# Patient Record
Sex: FEMALE | Race: BLACK OR AFRICAN AMERICAN | NOT HISPANIC OR LATINO | Employment: FULL TIME | URBAN - METROPOLITAN AREA
[De-identification: names, ages, dates, MRNs, and addresses within clinical notes are randomized per-mention and may not be internally consistent; named-entity substitution may affect disease eponyms.]

---

## 2017-03-02 ENCOUNTER — OFFICE VISIT (OUTPATIENT)
Dept: OBSTETRICS AND GYNECOLOGY | Facility: CLINIC | Age: 27
End: 2017-03-02
Payer: COMMERCIAL

## 2017-03-02 ENCOUNTER — LAB VISIT (OUTPATIENT)
Dept: LAB | Facility: HOSPITAL | Age: 27
End: 2017-03-02
Attending: MIDWIFE
Payer: COMMERCIAL

## 2017-03-02 VITALS
DIASTOLIC BLOOD PRESSURE: 78 MMHG | WEIGHT: 234.81 LBS | HEIGHT: 64 IN | SYSTOLIC BLOOD PRESSURE: 120 MMHG | BODY MASS INDEX: 40.09 KG/M2

## 2017-03-02 DIAGNOSIS — N91.2 AMENORRHEA: ICD-10-CM

## 2017-03-02 DIAGNOSIS — N91.2 AMENORRHEA: Primary | ICD-10-CM

## 2017-03-02 LAB — HCG INTACT+B SERPL-ACNC: 127 MIU/ML

## 2017-03-02 PROCEDURE — 99999 PR PBB SHADOW E&M-EST. PATIENT-LVL II: CPT | Mod: PBBFAC,,, | Performed by: MIDWIFE

## 2017-03-02 PROCEDURE — 1160F RVW MEDS BY RX/DR IN RCRD: CPT | Mod: S$GLB,,, | Performed by: MIDWIFE

## 2017-03-02 PROCEDURE — 84702 CHORIONIC GONADOTROPIN TEST: CPT | Mod: PO

## 2017-03-02 PROCEDURE — 36415 COLL VENOUS BLD VENIPUNCTURE: CPT | Mod: PO

## 2017-03-02 PROCEDURE — 99212 OFFICE O/P EST SF 10 MIN: CPT | Mod: S$GLB,,, | Performed by: MIDWIFE

## 2017-03-02 NOTE — MR AVS SNAPSHOT
[facility]  [address] [address] [address]  Phone: [phone]                  Modesta Smith   3/2/2017 9:30 AM   Office Visit    Description:  Female : 1990   Provider:  Dequan Webb CNM   Department:  [facility]           Reason for Visit     Possible Pregnancy           Diagnoses this Visit        Comments    Amenorrhea    -  Primary            To Do List           Future Appointments        Provider Department Dept Phone    3/2/2017 10:20 AM [facility] LABORATORY [facility] [phone]      Goals (5 Years of Data)     None      [facility]     [facility] -  Assistance  Registered nurses in the [facility] provide clinical advisement, health education, appointment booking, and other advisory services.  Call for this free service at [phone].             Medications           Message regarding Medications     Verify the changes and/or additions to your medication regime listed below are the same as discussed with your clinician today.  If any of these changes or additions are incorrect, please notify your healthcare provider.        STOP taking these medications     drospirenone-ethinyl estradiol (ANY) 3-0.03 mg per tablet Take 1 tablet by mouth once daily.           Verify that the below list of medications is an accurate representation of the medications you are currently taking.  If none reported, the list may be blank. If incorrect, please contact your healthcare provider. Carry this list with you in case of emergency.           Current Medications            Clinical Reference Information           Your Vitals Were     BP                   120/78           Blood Pressure          Most Recent Value    BP  120/78      Allergies as of 3/2/2017     No Known Allergies      Immunizations Administered on Date of Encounter - 3/2/2017     None      Orders Placed During Today's Visit     Future  Labs/Procedures Expected by Expires    hCG, quantitative  3/2/2017 5/1/2018      [facility] Sign-Up     Activating your [facility] account is as easy as 1-2-3!     1) Visit [email or web address], select Sign Up Now, enter this activation code and your date of birth, then select Next.  [email or web address]  Expires: 4/16/2017 10:09 AM      2) Create a username and password to use when you visit [facility] in the future and select a security question in case you lose your password and select Next.    3) Enter your e-mail address and click Sign Up!    Additional Information  If you have questions, please e-mail [email or web address] or call [phone] to talk to our [facility] staff. Remember, [facility] is NOT to be used for urgent needs. For medical emergencies, dial 911.         Language Assistance Services     ATTENTION: Language assistance services are available, free of charge. Please call [phone].      ATENCIÓN: Si habla bruna, tiene a monique disposición servicios gratuitos de asistencia lingüística. Llame al [phone].     COURTNEY Ý: N?u b?n nói Ti?ng Vi?t, có các d?ch v? h? tr? ngôn ng? mi?n phí dành cho b?n. G?i s? [phone].         [facility] complies with applicable Federal civil rights laws and does not discriminate on the basis of race, color, national origin, age, disability, or sex.

## 2017-03-02 NOTE — PROGRESS NOTES
"Subjective:      Modesta Smith is a 26 y.o. female who presents for evaluation of + HPT. She believes she could be pregnant. Pregnancy is desired. Sexual Activity: has sex with males. Current symptoms also include: positive home pregnancy test. Last period was normal.     Patient's last menstrual period was 02/05/2017 (exact date).  The following portions of the patient's history were reviewed and updated as appropriate: allergies, current medications, past family history, past medical history, past social history, past surgical history and problem list.    Review of Systems  Pertinent items are noted in HPI.       Objective:      /78  Ht 5' 4" (1.626 m)  Wt 106.5 kg (234 lb 12.6 oz)  LMP 02/05/2017 (Exact Date)  BMI 40.3 kg/m2  General: alert, cooperative, no distress and no acute distress      Lab Review  Urine HCG: negative      Assessment:      Absence of menstruation.       Plan:   Quant BHCG today  If + return in 2 weeks for Risk assessment  "

## 2017-03-08 ENCOUNTER — TELEPHONE (OUTPATIENT)
Dept: OBSTETRICS AND GYNECOLOGY | Facility: CLINIC | Age: 27
End: 2017-03-08

## 2017-03-08 NOTE — TELEPHONE ENCOUNTER
Patient states she is cramping.  Denies bleeding.  Patient advised to go to ED if starts bleeding.  Patient verbalized understanding.

## 2017-03-08 NOTE — TELEPHONE ENCOUNTER
----- Message from Alexa Lopez sent at 3/8/2017  1:30 PM CST -----  Contact: pt  States she's cramping and she's 4 weeks pregnant. Please call pt at [phone]. Thank you

## 2017-03-16 ENCOUNTER — OFFICE VISIT (OUTPATIENT)
Dept: OBSTETRICS AND GYNECOLOGY | Facility: CLINIC | Age: 27
End: 2017-03-16
Payer: COMMERCIAL

## 2017-03-16 ENCOUNTER — TELEPHONE (OUTPATIENT)
Dept: OBSTETRICS AND GYNECOLOGY | Facility: CLINIC | Age: 27
End: 2017-03-16

## 2017-03-16 VITALS
HEIGHT: 64 IN | BODY MASS INDEX: 40.8 KG/M2 | DIASTOLIC BLOOD PRESSURE: 82 MMHG | WEIGHT: 239 LBS | SYSTOLIC BLOOD PRESSURE: 124 MMHG

## 2017-03-16 DIAGNOSIS — Z32.01 POSITIVE PREGNANCY TEST: Primary | ICD-10-CM

## 2017-03-16 DIAGNOSIS — N91.2 AMENORRHEA: ICD-10-CM

## 2017-03-16 DIAGNOSIS — O26.841 UTERINE SIZE DATE DISCREPANCY, FIRST TRIMESTER: ICD-10-CM

## 2017-03-16 LAB
B-HCG UR QL: POSITIVE
CTP QC/QA: YES

## 2017-03-16 PROCEDURE — 99999 PR PBB SHADOW E&M-EST. PATIENT-LVL II: CPT | Mod: PBBFAC,,, | Performed by: MIDWIFE

## 2017-03-16 PROCEDURE — 81025 URINE PREGNANCY TEST: CPT | Mod: S$GLB,,, | Performed by: MIDWIFE

## 2017-03-16 PROCEDURE — 99213 OFFICE O/P EST LOW 20 MIN: CPT | Mod: S$GLB,,, | Performed by: MIDWIFE

## 2017-03-16 PROCEDURE — 1160F RVW MEDS BY RX/DR IN RCRD: CPT | Mod: S$GLB,,, | Performed by: MIDWIFE

## 2017-03-16 NOTE — MR AVS SNAPSHOT
"    [facility]  [address] [address] [address]  Phone: [phone]                  Modesta Smith   3/16/2017 2:30 PM   Office Visit    Description:  Female : 1990   Provider:  Dequan Webb CNM   Department:  [facility]           Reason for Visit     Possible Pregnancy           Diagnoses this Visit        Comments    Positive pregnancy test    -  Primary     Amenorrhea         Uterine size date discrepancy, first trimester                To Do List           Future Appointments        Provider Department Dept Phone    2017 8:00 AM [facility], OB-GYN [facility] [phone]    2017 8:30 AM Dequan Webb CNM [facility] [phone]    2017 9:10 AM [facility] LABORATORY [facility] [phone]      Goals (5 Years of Data)     None      Follow-Up and Disposition     Return in about 3 weeks (around 2017).    Follow-up and Disposition History      [facility]     [facility] Line -  Assistance  Registered nurses in the [facility] provide clinical advisement, health education, appointment booking, and other advisory services.  Call for this free service at [phone].             Medications           Message regarding Medications     Verify the changes and/or additions to your medication regime listed below are the same as discussed with your clinician today.  If any of these changes or additions are incorrect, please notify your healthcare provider.             Verify that the below list of medications is an accurate representation of the medications you are currently taking.  If none reported, the list may be blank. If incorrect, please contact your healthcare provider. Carry this list with you in case of emergency.                Clinical Reference Information           Your Vitals Were     BP Height Weight Last Period BMI    124/82 5' 4" (1.626 m) 108.4 kg (238 lb 15.7 oz) " 02/05/2017 (Exact Date) 41.02 kg/m2      Blood Pressure          Most Recent Value    BP  124/82      Allergies as of 3/16/2017     No Known Allergies      Immunizations Administered on Date of Encounter - 3/16/2017     None      Orders Placed During Today's Visit      Normal Orders This Visit    C. trachomatis/N. gonorrhoeae by AMP DNA Urine     POCT urine pregnancy     Future Labs/Procedures Expected by Expires    CBC auto differential  3/16/2017 3/16/2018    Hepatitis B surface antigen  3/16/2017 3/16/2018    HIV-1 and HIV-2 antibodies  3/16/2017 3/16/2018    RPR  3/16/2017 3/16/2018    Rubella antibody, IgG  3/16/2017 3/16/2018    Type & Screen - Ob Profile  3/16/2017 3/16/2018    US OB/GYN Procedure ([facility])  As directed 3/16/2018         3/16/2017  3:02 PM - Dequan Webb CNM      Component Results     Component Value Flag Ref Range Units Status    POC Preg Test, Ur Positive (A) Negative  Final     Acceptable Yes    Final            [facility] Sign-Up     Activating your [facility] account is as easy as 1-2-3!     1) Visit [email or web address], select Sign Up Now, enter this activation code and your date of birth, then select Next.  [email or web address]  Expires: 4/16/2017 11:09 AM      2) Create a username and password to use when you visit [facility] in the future and select a security question in case you lose your password and select Next.    3) Enter your e-mail address and click Sign Up!    Additional Information  If you have questions, please e-mail [email or web address] or call [phone] to talk to our [facility] staff. Remember, [facility] is NOT to be used for urgent needs. For medical emergencies, dial 911.         Language Assistance Services     ATTENTION: Language assistance services are available, free of charge. Please call [phone].      ATENCIÓN: Si habla español, tiene a monique disposición servicios gratuitos de asistencia lingüística. Llame al [phone].     CHÚ Ý: N?u  b?n nói Ti?ng Vi?t, có các d?ch v? h? tr? ngôn ng? mi?n phí dành cho b?n. G?i s? [phone].         [facility] complies with applicable Federal civil rights laws and does not discriminate on the basis of race, color, national origin, age, disability, or sex.

## 2017-03-16 NOTE — PROGRESS NOTES
"Subjective:      Modesta Smith is a 26 y.o. female who presents for evaluation of amenorrhea. She believes she could be pregnant. Pregnancy is desired. Sexual Activity: single partner, contraception: none. Current symptoms also include: breast tenderness, morning sickness and positive home pregnancy test. Last period was normal.     Patient's last menstrual period was 02/05/2017 (exact date).  The following portions of the patient's history were reviewed and updated as appropriate: allergies, current medications, past family history, past medical history, past social history, past surgical history and problem list.    Review of Systems  Pertinent items are noted in HPI.       Objective:      /82  Ht 5' 4" (1.626 m)  Wt 108.4 kg (238 lb 15.7 oz)  LMP 02/05/2017 (Exact Date)  BMI 41.02 kg/m2  General: alert, cooperative, no distress and no acute distress      Lab Review  Urine HCG: positive      Assessment:      Absence of menstruation.       Plan:     Prenatal Vitamins  Prenatal Labs at [facility]  GC/CT in urine today  Exam at [facility]  PAP PP  Initial OB and US in 3 weeks  "

## 2017-03-20 ENCOUNTER — HOSPITAL ENCOUNTER (EMERGENCY)
Facility: HOSPITAL | Age: 27
Discharge: HOME | End: 2017-03-20
Attending: EMERGENCY MEDICINE
Payer: COMMERCIAL

## 2017-03-20 VITALS
HEART RATE: 94 BPM | SYSTOLIC BLOOD PRESSURE: 124 MMHG | HEIGHT: 64 IN | DIASTOLIC BLOOD PRESSURE: 78 MMHG | OXYGEN SATURATION: 95 % | RESPIRATION RATE: 18 BRPM | BODY MASS INDEX: 40.46 KG/M2 | TEMPERATURE: 98 F | WEIGHT: 237 LBS

## 2017-03-20 DIAGNOSIS — O20.0 THREATENED MISCARRIAGE IN EARLY PREGNANCY: Primary | ICD-10-CM

## 2017-03-20 DIAGNOSIS — R31.9 HEMATURIA: ICD-10-CM

## 2017-03-20 LAB
ABO + RH BLD: NORMAL
ALBUMIN SERPL BCP-MCNC: 3.8 G/DL
ALP SERPL-CCNC: 58 U/L
ALT SERPL W/O P-5'-P-CCNC: 24 U/L
ANION GAP SERPL CALC-SCNC: 13 MMOL/L
AST SERPL-CCNC: 18 U/L
B-HCG UR QL: POSITIVE
BACTERIA #/AREA URNS AUTO: ABNORMAL /HPF
BACTERIA GENITAL QL WET PREP: ABNORMAL
BASOPHILS # BLD AUTO: 0.03 K/UL
BASOPHILS NFR BLD: 0.3 %
BILIRUB SERPL-MCNC: 0.6 MG/DL
BILIRUB UR QL STRIP: NEGATIVE
BUN SERPL-MCNC: 11 MG/DL
C TRACH DNA SPEC QL NAA+PROBE: NOT DETECTED
CALCIUM SERPL-MCNC: 9.9 MG/DL
CHLORIDE SERPL-SCNC: 105 MMOL/L
CLARITY UR REFRACT.AUTO: ABNORMAL
CLUE CELLS VAG QL WET PREP: ABNORMAL
CO2 SERPL-SCNC: 20 MMOL/L
COLOR UR AUTO: ABNORMAL
CREAT SERPL-MCNC: 0.7 MG/DL
DIFFERENTIAL METHOD: ABNORMAL
EOSINOPHIL # BLD AUTO: 0.2 K/UL
EOSINOPHIL NFR BLD: 1.6 %
ERYTHROCYTE [DISTWIDTH] IN BLOOD BY AUTOMATED COUNT: 13.1 %
EST. GFR  (AFRICAN AMERICAN): >60 ML/MIN/1.73 M^2
EST. GFR  (NON AFRICAN AMERICAN): >60 ML/MIN/1.73 M^2
FILAMENT FUNGI VAG WET PREP-#/AREA: ABNORMAL
GLUCOSE SERPL-MCNC: 104 MG/DL
GLUCOSE UR QL STRIP: NEGATIVE
HCG INTACT+B SERPL-ACNC: NORMAL MIU/ML
HCT VFR BLD AUTO: 42.2 %
HGB BLD-MCNC: 14.4 G/DL
HGB UR QL STRIP: ABNORMAL
HYALINE CASTS UR QL AUTO: 0 /LPF
KETONES UR QL STRIP: NEGATIVE
LEUKOCYTE ESTERASE UR QL STRIP: ABNORMAL
LYMPHOCYTES # BLD AUTO: 3.4 K/UL
LYMPHOCYTES NFR BLD: 34.6 %
MCH RBC QN AUTO: 26.8 PG
MCHC RBC AUTO-ENTMCNC: 34.1 %
MCV RBC AUTO: 78 FL
MICROSCOPIC COMMENT: ABNORMAL
MONOCYTES # BLD AUTO: 0.5 K/UL
MONOCYTES NFR BLD: 5 %
N GONORRHOEA DNA SPEC QL NAA+PROBE: NOT DETECTED
NEUTROPHILS # BLD AUTO: 5.7 K/UL
NEUTROPHILS NFR BLD: 58.3 %
NITRITE UR QL STRIP: NEGATIVE
PH UR STRIP: 6 [PH] (ref 5–8)
PLATELET # BLD AUTO: 254 K/UL
PMV BLD AUTO: 10 FL
POTASSIUM SERPL-SCNC: 3.9 MMOL/L
PROT SERPL-MCNC: 8 G/DL
PROT UR QL STRIP: ABNORMAL
RBC # BLD AUTO: 5.38 M/UL
RBC #/AREA URNS AUTO: >100 /HPF (ref 0–4)
RH BLD: NORMAL
SODIUM SERPL-SCNC: 138 MMOL/L
SP GR UR STRIP: 1.01 (ref 1–1.03)
SPECIMEN SOURCE: ABNORMAL
SQUAMOUS #/AREA URNS AUTO: 4 /HPF
T VAGINALIS GENITAL QL WET PREP: ABNORMAL
URN SPEC COLLECT METH UR: ABNORMAL
UROBILINOGEN UR STRIP-ACNC: NEGATIVE EU/DL
WBC # BLD AUTO: 9.76 K/UL
WBC #/AREA URNS AUTO: 3 /HPF (ref 0–5)
WBC #/AREA VAG WET PREP: ABNORMAL
YEAST GENITAL QL WET PREP: ABNORMAL

## 2017-03-20 PROCEDURE — 81025 URINE PREGNANCY TEST: CPT

## 2017-03-20 PROCEDURE — 86901 BLOOD TYPING SEROLOGIC RH(D): CPT

## 2017-03-20 PROCEDURE — 96361 HYDRATE IV INFUSION ADD-ON: CPT

## 2017-03-20 PROCEDURE — 99284 EMERGENCY DEPT VISIT MOD MDM: CPT | Mod: 25

## 2017-03-20 PROCEDURE — 87210 SMEAR WET MOUNT SALINE/INK: CPT

## 2017-03-20 PROCEDURE — 86901 BLOOD TYPING SEROLOGIC RH(D): CPT | Mod: 91

## 2017-03-20 PROCEDURE — 96360 HYDRATION IV INFUSION INIT: CPT

## 2017-03-20 PROCEDURE — 81000 URINALYSIS NONAUTO W/SCOPE: CPT

## 2017-03-20 PROCEDURE — 80053 COMPREHEN METABOLIC PANEL: CPT

## 2017-03-20 PROCEDURE — 25000003 PHARM REV CODE 250: Performed by: EMERGENCY MEDICINE

## 2017-03-20 PROCEDURE — 85025 COMPLETE CBC W/AUTO DIFF WBC: CPT

## 2017-03-20 PROCEDURE — 84702 CHORIONIC GONADOTROPIN TEST: CPT

## 2017-03-20 PROCEDURE — 87591 N.GONORRHOEAE DNA AMP PROB: CPT

## 2017-03-20 PROCEDURE — 86900 BLOOD TYPING SEROLOGIC ABO: CPT

## 2017-03-20 RX ORDER — SODIUM CHLORIDE 9 MG/ML
125 INJECTION, SOLUTION INTRAVENOUS CONTINUOUS
Status: DISCONTINUED | OUTPATIENT
Start: 2017-03-20 | End: 2017-03-20 | Stop reason: HOSPADM

## 2017-03-20 RX ORDER — NITROFURANTOIN 25; 75 MG/1; MG/1
100 CAPSULE ORAL 2 TIMES DAILY
Qty: 10 CAPSULE | Refills: 0 | Status: SHIPPED | OUTPATIENT
Start: 2017-03-20 | End: 2017-03-25

## 2017-03-20 RX ADMIN — SODIUM CHLORIDE 125 ML/HR: 0.9 INJECTION, SOLUTION INTRAVENOUS at 06:03

## 2017-03-20 NOTE — DISCHARGE INSTRUCTIONS
Blood in the Urine    Blood in the urine (hematuria) has many possible causes. If it occurs after an injury (such as a car accident or fall), it is most often a sign of bruising to the kidney or bladder. Common causes of blood in the urine include urinary tract infections, kidney stones, inflammation, tumors, or certain other diseases of the kidney or bladder. Menstruation can cause blood to appear in the urine sample, although it is not coming from the urinary tract.  If only a trace amount of blood is present, it will show up on the urine test, even though the urine may be yellow and not pink or red. This may occur with any of the above conditions, as well as heavy exercise or high fever. In this case, your doctor may want to repeat the urine test on another day. This will show if the blood is still present. If it is, then other tests can be done to find out the cause.  Home care  Follow these home care guidelines:  · If your urine does not appear bloody (pink, brown or red) then you do not need to restrict your activity in any way.  · If you can see blood in your urine, rest and avoid heavy exertion until your next exam. Do not use aspirin, blood thinners, or anti-platelet or anti-inflammatory medicines. These include ibuprofen and naproxen. These thin the blood and may increase bleeding.  Follow-up care  Follow up with your healthcare provider, or as advised. If you were injured and had blood in your urine, you should have a repeat urine test in 1 to 2 days. Contact your doctor for this test.  A radiologist will review any X-rays that were taken. You will be told of any new findings that may affect your care.  When to seek medical advice  Call your healthcare provider right away if any of these occur:  · Bright red blood or blood clots in the urine (if you did not have this before)  · Weakness, dizziness or fainting  · New groin, abdominal, or back pain  · Fever of 100.4ºF (38ºC) or higher, or as directed by  your healthcare provider  · Repeated vomiting  · Bleeding from the nose or gums or easy bruising  Date Last Reviewed: 9/1/2016 © 2000-2016 [employer]. [address] [place] [address], [place] [address]. All rights reserved. This information is not intended as a substitute for professional medical care. Always follow your healthcare professional's instructions.

## 2017-03-20 NOTE — ED NOTES
"Pt  States she woke this am with pain to vaginal area & vag bleeding, states she passed 1 small clot,  States she is gr3,para2,ab0 & 6 wks gestation Will continue to monitorPatient verbally verified and Spelled Full Name and Date of Birth. LOC: The patient is awake, alert and aware of environment with an appropriate affect, the patient is oriented x 3 and speaking appropriately.  APPEARANCE: Patient resting comfortably and in no acute distress, patient is clean and well groomed, patient's clothing is properly fastened.  HEENT: Brief WNL  SKIN: Brief WNL.   MUSCULOSKELETAL: Brief WNL  RESPIRATORY:clear timothy  CARDIAC: Brief WNL  GASTRO: denies crampiing or abd tenderness  : denies pain or burning with urination, positive for white, foul "fishy"  smelling vag d/c, no hx STD  Peripheral Vasc: Brief WNL  NEURO: Brief WNL  PSYCH: Brief WNL  "

## 2017-03-20 NOTE — ED PROVIDER NOTES
"Encounter Date: 3/20/2017       History     Chief Complaint   Patient presents with    Vaginal Bleeding     +pregnancy test on 3/16, started with bleeding this morning. denies any cramping     Review of patient's allergies indicates:  No Known Allergies  Patient is a 26 y.o. female presenting with the following complaint: female genitourinary complaint. The history is provided by the patient.   Female  Problem   Primary symptoms include vaginal bleeding.    Primary symptoms include no discharge, no pelvic pain, no fever, no dyspareunia, no genital lesions, no genital rash, no genital itching, no genital odor, no dysuria.   This is a new problem. The current episode started just prior to arrival. She is pregnant. Pregnancies:  - 3, Para - 2, Abortions (including miscarriages) -. LMP: 17. The discharge was malodorous. Associated symptoms include abdominal pain. Pertinent negatives include no diaphoresis, no abdominal swelling, no constipation, no diarrhea, no nausea, no vomiting, no light-headedness and no dizziness. Associated symptoms comments: Mild abd pain "like a bladder infection.". She has tried nothing for the symptoms. Associated medical issues do not include miscarriage.   Pt c sim episodes c her first 2 pregnancies      Past Medical History:   Diagnosis Date    sickle cell trait      Past Surgical History:   Procedure Laterality Date     SECTION      X2    MOUTH SURGERY       Family History   Problem Relation Age of Onset    Diabetes Other     Hypertension Other     Hypertension Mother     Hypertension Father     Breast cancer Neg Hx     Ovarian cancer Neg Hx     Deep vein thrombosis Neg Hx      Social History   Substance Use Topics    Smoking status: Never Smoker    Smokeless tobacco: None    Alcohol use No     Review of Systems   Constitutional: Negative for diaphoresis and fever.   HENT: Negative for sore throat.    Respiratory: Negative for shortness of breath.  " "  Cardiovascular: Negative for chest pain.   Gastrointestinal: Positive for abdominal pain. Negative for constipation, diarrhea, nausea and vomiting.   Genitourinary: Positive for vaginal bleeding. Negative for dyspareunia, dysuria, flank pain, hematuria and pelvic pain.   Musculoskeletal: Negative for back pain.   Skin: Negative for rash.   Neurological: Negative for dizziness, weakness and light-headedness.   Hematological: Does not bruise/bleed easily.   All other systems reviewed and are negative.      Physical Exam   Initial Vitals   BP Pulse Resp Temp SpO2   03/20/17 0555 03/20/17 0555 03/20/17 0555 03/20/17 0555 03/20/17 0555   121/75 95 18 98.3 °F (36.8 °C) 99 %     Vitals:    03/20/17 0555 03/20/17 0809 03/20/17 0951   BP: 121/75 118/69 124/78   Pulse: 95 92 94   Resp: 18     Temp: 98.3 °F (36.8 °C) 97.8 °F (36.6 °C) 97.7 °F (36.5 °C)   TempSrc: Oral Oral Oral   SpO2: 99% 99% 95%   Weight: 107.5 kg (237 lb)     Height: 5' 4" (1.626 m)       Physical Exam    Nursing note and vitals reviewed.  Constitutional: She appears well-nourished. No distress.   HENT:   Head: Normocephalic and atraumatic.   Eyes: EOM are normal. Pupils are equal, round, and reactive to light.   Neck: Normal range of motion. Neck supple.   Cardiovascular: Normal rate, regular rhythm, normal heart sounds and intact distal pulses.   Pulmonary/Chest: Breath sounds normal. No stridor. She has no wheezes. She has no rhonchi.   Abdominal: Soft. Bowel sounds are normal. She exhibits no mass. There is no rebound and no guarding.   Genitourinary: There is no rash or tenderness on the right labia. There is no rash or tenderness on the left labia. Uterus is tender. Cervix exhibits no motion tenderness, no discharge and no friability. Right adnexum displays tenderness. Right adnexum displays no mass and no fullness. Left adnexum displays tenderness. Left adnexum displays no mass. No erythema, tenderness or bleeding in the vagina. No foreign body in " the vagina. No signs of injury around the vagina. Vaginal discharge found.   Genitourinary Comments: Patient has diffuse bilateral adnexal and suprapubic tenderness.  No discrete masses are appreciated.  No CMT.  No blood noted.   Musculoskeletal: Normal range of motion. She exhibits no edema.   Neurological: She is alert and oriented to person, place, and time. She has normal strength. No sensory deficit.   Skin: Skin is warm and dry. No rash noted.   Psychiatric: She has a normal mood and affect. Her behavior is normal. Judgment and thought content normal.         ED Course   Procedures  Labs Reviewed   CBC W/ AUTO DIFFERENTIAL - Abnormal; Notable for the following:        Result Value    MCV 78 (*)     MCH 26.8 (*)     All other components within normal limits   COMPREHENSIVE METABOLIC PANEL - Abnormal; Notable for the following:     CO2 20 (*)     All other components within normal limits   VAGINAL SCREEN - Abnormal; Notable for the following:     WBC - Vaginal Screen Few (*)     Bacteria - Vaginal Screen Many (*)     All other components within normal limits   URINALYSIS - Abnormal; Notable for the following:     Color, UA Red (*)     Appearance, UA Cloudy (*)     Protein, UA 1+ (*)     Occult Blood UA 3+ (*)     Leukocytes, UA Trace (*)     All other components within normal limits   URINALYSIS MICROSCOPIC - Abnormal; Notable for the following:     RBC, UA >100 (*)     All other components within normal limits   C. TRACHOMATIS/N. GONORRHOEAE BY AMP DNA   HCG, QUANTITATIVE, PREGNANCY   PREGNANCY TEST, URINE RAPID   URINALYSIS   GROUP & RH   RH TYPING        Results for orders placed or performed during the hospital encounter of 03/20/17   C. trachomatis/N. gonorrhoeae by AMP DNA Cervix   Result Value Ref Range    Chlamydia, Amplified DNA Not Detected Not Detected    N gonorrhoeae, amplified DNA Not Detected Not Detected   CBC W/ AUTO DIFFERENTIAL   Result Value Ref Range    WBC 9.76 3.90 - 12.70 K/uL    RBC  5.38 4.00 - 5.40 M/uL    Hemoglobin 14.4 12.0 - 16.0 g/dL    Hematocrit 42.2 37.0 - 48.5 %    MCV 78 (L) 82 - 98 fL    MCH 26.8 (L) 27.0 - 31.0 pg    MCHC 34.1 32.0 - 36.0 %    RDW 13.1 11.5 - 14.5 %    Platelets 254 150 - 350 K/uL    MPV 10.0 9.2 - 12.9 fL    Gran # 5.7 1.8 - 7.7 K/uL    Lymph # 3.4 1.0 - 4.8 K/uL    Mono # 0.5 0.3 - 1.0 K/uL    Eos # 0.2 0.0 - 0.5 K/uL    Baso # 0.03 0.00 - 0.20 K/uL    Gran% 58.3 38.0 - 73.0 %    Lymph% 34.6 18.0 - 48.0 %    Mono% 5.0 4.0 - 15.0 %    Eosinophil% 1.6 0.0 - 8.0 %    Basophil% 0.3 0.0 - 1.9 %    Differential Method Automated    Comp. Metabolic Panel   Result Value Ref Range    Sodium 138 136 - 145 mmol/L    Potassium 3.9 3.5 - 5.1 mmol/L    Chloride 105 95 - 110 mmol/L    CO2 20 (L) 23 - 29 mmol/L    Glucose 104 70 - 110 mg/dL    BUN, Bld 11 6 - 20 mg/dL    Creatinine 0.7 0.5 - 1.4 mg/dL    Calcium 9.9 8.7 - 10.5 mg/dL    Total Protein 8.0 6.0 - 8.4 g/dL    Albumin 3.8 3.5 - 5.2 g/dL    Total Bilirubin 0.6 0.1 - 1.0 mg/dL    Alkaline Phosphatase 58 55 - 135 U/L    AST 18 10 - 40 U/L    ALT 24 10 - 44 U/L    Anion Gap 13 8 - 16 mmol/L    eGFR if African American >60.0 >60 mL/min/1.73 m^2    eGFR if non African American >60.0 >60 mL/min/1.73 m^2   hCG, quantitative   Result Value Ref Range    hCG Quant 32297 See Text mIU/mL   Pregnancy, urine rapid   Result Value Ref Range    Preg Test, Ur Positive    Vaginal Screen Vagina   Result Value Ref Range    Trichomonas None None    Clue Cells, Wet Prep None None    Budding Yeast None None    Fungal Hyphae None None    WBC - Vaginal Screen Few (A) None    Bacteria - Vaginal Screen Many (A) None    Wet Prep Source Vagina None   Urinalysis   Result Value Ref Range    Specimen UA Urine, Clean Catch     Color, UA Red (A) Yellow, Straw, Melanie    Appearance, UA Cloudy (A) Clear    pH, UA 6.0 5.0 - 8.0    Specific Gravity, UA 1.010 1.005 - 1.030    Protein, UA 1+ (A) Negative    Glucose, UA Negative Negative    Ketones, UA Negative  Negative    Bilirubin (UA) Negative Negative    Occult Blood UA 3+ (A) Negative    Nitrite, UA Negative Negative    Urobilinogen, UA Negative <2.0 EU/dL    Leukocytes, UA Trace (A) Negative   Urinalysis Microscopic   Result Value Ref Range    RBC, UA >100 (H) 0 - 4 /hpf    WBC, UA 3 0 - 5 /hpf    Bacteria, UA Occasional None-Occ /hpf    Squam Epithel, UA 4 /hpf    Hyaline Casts, UA 0 0-1/lpf /lpf    Microscopic Comment SEE COMMENT    ABO/Rh   Result Value Ref Range    Group & Rh AB NEG    Rh Typing   Result Value Ref Range    Rh Type NEG                 Imaging Results         US OB Less Than 14 Wks with Transvag(xpd (Final result) Result time:  03/20/17 09:29:53    Final result by Pippa Lazcano MD (03/20/17 09:29:53)    Impression:        Single, viable intrauterine pregnancy estimated gestational age 6 weeks 2 days.        Electronically signed by: PIPPA LAZCANO  Date:     03/20/17  Time:    09:29     Narrative:    Exam: US OB LESS THAN 14 WKS WITH TRANSVAGINAL (XPD)     Indication: Pregnant.  Vaginal bleeding.    Findings: Transabdominal and transvaginal imaging maternal pelvis performed.  Uterus 14.1 x 5.9 x 9.5 cm with a intrauterine gestational sac.  Mean sac diameter 17 mm corresponding with estimated gestational age 6 weeks 1 days.  Crown-rump length measurement corresponds with estimated 6 weeks 2 days.  This is congruent with last menstrual period dating 6 weeks 1 day and estimated delivery 11/12/2017.  Fetal heart rate 124 beats per minute.  No subchorionic hemorrhage.  The ovaries not visualized.  No adnexal mass or pelvic free fluid.                         ED Course   10:28 AM patient essentially without complaint and she has had additional scant pinkish tinge to her urine.  We discussed the differential diagnosis including threatened miscarriage however she feels like she might have a bladder infection and there is no significant finding of infection in her urine right now.  She does have some signs  of hematuria-that could be from vaginal bleeding from a miscarriage however on exam I do not appreciate any blood in her vaginal vault.  She will follow-up with her OB/GYN doctor I will treat her prophylactically for urinary tract infection she's been given instructions for pelvic rest regarding her threatened miscarriage and she will confirm that her hematuria has resolved with her primary care physician or follow-up with a urologist for further evaluation if needed.  Return to emergency department immediately for any worsening signs or symptoms.  Patient states she's had BV in the past and she had a slightly malodorous discharge and she is concerned she might have it again.  I discussed that she does not have any evidence of BV or Trichomonas at this time and she had no discharge appreciated on exam.  I did obtain a GC chlamydia as well because I knew that that would've part of her prenatal workup but have no suspicion of infection at this time so I have not treated her prophylactically for GC chlamydia.  She'll follow-up with her OB/GYN doctor    Clinical Impression:   The primary encounter diagnosis was Threatened miscarriage in early pregnancy. A diagnosis of Hematuria was also pertinent to this visit.    Disposition:   Disposition: Discharged  Condition: Stable       Luan Woodard MD  03/21/17 0805

## 2017-04-05 ENCOUNTER — PROCEDURE VISIT (OUTPATIENT)
Dept: OBSTETRICS AND GYNECOLOGY | Facility: CLINIC | Age: 27
End: 2017-04-05
Payer: COMMERCIAL

## 2017-04-05 ENCOUNTER — INITIAL PRENATAL (OUTPATIENT)
Dept: OBSTETRICS AND GYNECOLOGY | Facility: CLINIC | Age: 27
End: 2017-04-05
Payer: COMMERCIAL

## 2017-04-05 ENCOUNTER — LAB VISIT (OUTPATIENT)
Dept: LAB | Facility: HOSPITAL | Age: 27
End: 2017-04-05
Attending: MIDWIFE
Payer: COMMERCIAL

## 2017-04-05 VITALS
SYSTOLIC BLOOD PRESSURE: 122 MMHG | DIASTOLIC BLOOD PRESSURE: 78 MMHG | WEIGHT: 236.75 LBS | BODY MASS INDEX: 40.64 KG/M2

## 2017-04-05 DIAGNOSIS — Z32.01 POSITIVE PREGNANCY TEST: ICD-10-CM

## 2017-04-05 DIAGNOSIS — O99.211 OBESITY DURING PREGNANCY IN FIRST TRIMESTER: Primary | ICD-10-CM

## 2017-04-05 DIAGNOSIS — O26.841 UTERINE SIZE DATE DISCREPANCY, FIRST TRIMESTER: ICD-10-CM

## 2017-04-05 DIAGNOSIS — Z3A.08 8 WEEKS GESTATION OF PREGNANCY: ICD-10-CM

## 2017-04-05 DIAGNOSIS — Z98.891 HISTORY OF C-SECTION: ICD-10-CM

## 2017-04-05 LAB
ABO + RH BLD: NORMAL
BASOPHILS # BLD AUTO: 0.03 K/UL
BASOPHILS NFR BLD: 0.4 %
BLD GP AB SCN CELLS X3 SERPL QL: NORMAL
DIFFERENTIAL METHOD: ABNORMAL
EOSINOPHIL # BLD AUTO: 0.2 K/UL
EOSINOPHIL NFR BLD: 2 %
ERYTHROCYTE [DISTWIDTH] IN BLOOD BY AUTOMATED COUNT: 12.9 %
HCT VFR BLD AUTO: 40.4 %
HGB BLD-MCNC: 14.3 G/DL
LYMPHOCYTES # BLD AUTO: 2.7 K/UL
LYMPHOCYTES NFR BLD: 31.8 %
MCH RBC QN AUTO: 28 PG
MCHC RBC AUTO-ENTMCNC: 35.4 %
MCV RBC AUTO: 79 FL
MONOCYTES # BLD AUTO: 0.4 K/UL
MONOCYTES NFR BLD: 4.5 %
NEUTROPHILS # BLD AUTO: 5.2 K/UL
NEUTROPHILS NFR BLD: 61.2 %
PLATELET # BLD AUTO: 274 K/UL
PMV BLD AUTO: 10.6 FL
RBC # BLD AUTO: 5.1 M/UL
RPR SER QL: NORMAL
WBC # BLD AUTO: 8.51 K/UL

## 2017-04-05 PROCEDURE — 0502F SUBSEQUENT PRENATAL CARE: CPT | Mod: S$GLB,,, | Performed by: MIDWIFE

## 2017-04-05 PROCEDURE — 86900 BLOOD TYPING SEROLOGIC ABO: CPT

## 2017-04-05 PROCEDURE — 86850 RBC ANTIBODY SCREEN: CPT

## 2017-04-05 PROCEDURE — 76801 OB US < 14 WKS SINGLE FETUS: CPT | Mod: S$GLB,,, | Performed by: OBSTETRICS & GYNECOLOGY

## 2017-04-05 PROCEDURE — 86762 RUBELLA ANTIBODY: CPT

## 2017-04-05 PROCEDURE — 85025 COMPLETE CBC W/AUTO DIFF WBC: CPT

## 2017-04-05 PROCEDURE — 99999 PR PBB SHADOW E&M-EST. PATIENT-LVL II: CPT | Mod: PBBFAC,,, | Performed by: MIDWIFE

## 2017-04-05 PROCEDURE — 87340 HEPATITIS B SURFACE AG IA: CPT

## 2017-04-05 PROCEDURE — 86703 HIV-1/HIV-2 1 RESULT ANTBDY: CPT

## 2017-04-05 PROCEDURE — 1160F RVW MEDS BY RX/DR IN RCRD: CPT | Mod: S$GLB,,, | Performed by: MIDWIFE

## 2017-04-05 PROCEDURE — 86592 SYPHILIS TEST NON-TREP QUAL: CPT

## 2017-04-05 NOTE — PROGRESS NOTES
Subjective:      Modesta Smith is being seen today for her first obstetrical visit.  This is a planned pregnancy. She is at 8w4d gestation. Her obstetrical history is significant for obesity and Previous  X 2. Relationship with FOB: spouse, living together. Patient does not intend to breast feed. Pregnancy history fully reviewed.    Menstrual History:  OB History      Para Term  AB TAB SAB Ectopic Multiple Living    3 2 2       2           Patient's last menstrual period was 2017 (exact date).       The following portions of the patient's history were reviewed and updated as appropriate: allergies, current medications, past family history, past medical history, past social history, past surgical history and problem list.    Review of Systems  Pertinent items are noted in HPI.      Objective:      General appearance: alert, appears stated age, cooperative and no distress      Assessment:      Pregnancy at 8 and 4/7 weeks      Plan:      Initial labs drawn.  Prenatal vitamins.  Problem list reviewed and updated.  AFP3 discussed: undecided.  Role of ultrasound in pregnancy discussed; fetal survey: requested.  Amniocentesis discussed: not indicated.  Follow up in 4 weeks.  Pt desires , discussed R/B/A w/ pt and s/o  100% of 15 min visit spent on counseling and coordination of care.

## 2017-04-05 NOTE — MR AVS SNAPSHOT
[facility]  [address] [address] [address]  Phone: [phone]                  Modesta Smith   2017 8:30 AM   Initial Prenatal    Description:  Female : 1990   Provider:  Dequan Webb CNM   Department:  [facility]           Reason for Visit     Initial Prenatal Visit           Diagnoses this Visit        Comments    Obesity during pregnancy in first trimester    -  Primary     8 weeks gestation of pregnancy         History of                 To Do List           Future Appointments        Provider Department Dept Phone    5/3/2017 2:45 PM Dequan Webb CNM [facility]--[phone]      Goals (5 Years of Data)     None      Follow-Up and Disposition     Return in about 4 weeks (around 5/3/2017).      [facility]     [facility] Nurse Care Line -  Assistance  Unless otherwise directed by your provider, please contact [facility], our nurse care line that is available for  assistance.     Registered nurses in the [facility] Center provide: appointment scheduling, clinical advisement, health education, and other advisory services.  Call: [phone] (toll free)               Medications           Message regarding Medications     Verify the changes and/or additions to your medication regime listed below are the same as discussed with your clinician today.  If any of these changes or additions are incorrect, please notify your healthcare provider.             Verify that the below list of medications is an accurate representation of the medications you are currently taking.  If none reported, the list may be blank. If incorrect, please contact your healthcare provider. Carry this list with you in case of emergency.                Clinical Reference Information           Prenatal Vitals     Enc. Date GA Prenatal Vitals Prenatal Pulse Pain Level Urine Albumin/Glucose Edema Presentation Dilation/Effacement/Station     4/5/17 8w4d 122/78 / 107.4 kg (236 lb 12.4 oz)  / 173    None / None / None / No         TWG: -0.556 kg (-1 lb 3.6 oz)   Pregravid weight: 108 kg (238 lb)   Number of fetuses: 1       Your Vitals Were     BP Weight Last Period BMI       122/78 107.4 kg (236 lb 12.4 oz) 02/05/2017 (Exact Date) 40.64 kg/m2       Allergies as of 4/5/2017     No Known Allergies      Immunizations Administered on Date of Encounter - 4/5/2017     None      [facility] Sign-Up     Activating your [facility] account is as easy as 1-2-3!     1) Visit [email or web address], select Sign Up Now, enter this activation code and your date of birth, then select Next.  [email or web address]  Expires: 4/16/2017 11:09 AM      2) Create a username and password to use when you visit [facility] in the future and select a security question in case you lose your password and select Next.    3) Enter your e-mail address and click Sign Up!    Additional Information  If you have questions, please e-mail [email or web address] or call [phone] to talk to our [facility] staff. Remember, [facility] is NOT to be used for urgent needs. For medical emergencies, dial 911.         Language Assistance Services     ATTENTION: Language assistance services are available, free of charge. Please call [phone].      ATENCIÓN: Si habla español, tiene a monique disposición servicios gratuitos de asistencia lingüística. Llame al [phone].     CHÚ Ý: N?u b?n nói Ti?ng Vi?t, có các d?ch v? h? tr? ngôn ng? mi?n phí dành cho b?n. G?i s? [phone].         [facility] complies with applicable Federal civil rights laws and does not discriminate on the basis of race, color, national origin, age, disability, or sex.

## 2017-04-06 LAB
HBV SURFACE AG SERPL QL IA: NEGATIVE
HIV 1+2 AB+HIV1 P24 AG SERPL QL IA: NEGATIVE
RUBV IGG SER-ACNC: 58.9 IU/ML
RUBV IGG SER-IMP: REACTIVE

## 2017-04-20 ENCOUNTER — PATIENT MESSAGE (OUTPATIENT)
Dept: OBSTETRICS AND GYNECOLOGY | Facility: CLINIC | Age: 27
End: 2017-04-20

## 2017-05-03 ENCOUNTER — ROUTINE PRENATAL (OUTPATIENT)
Dept: OBSTETRICS AND GYNECOLOGY | Facility: CLINIC | Age: 27
End: 2017-05-03
Payer: COMMERCIAL

## 2017-05-03 ENCOUNTER — TELEPHONE (OUTPATIENT)
Dept: OBSTETRICS AND GYNECOLOGY | Facility: CLINIC | Age: 27
End: 2017-05-03

## 2017-05-03 VITALS
WEIGHT: 240.06 LBS | DIASTOLIC BLOOD PRESSURE: 90 MMHG | BODY MASS INDEX: 41.21 KG/M2 | SYSTOLIC BLOOD PRESSURE: 120 MMHG

## 2017-05-03 DIAGNOSIS — O99.211 OBESITY DURING PREGNANCY IN FIRST TRIMESTER: Primary | ICD-10-CM

## 2017-05-03 DIAGNOSIS — Z3A.12 12 WEEKS GESTATION OF PREGNANCY: ICD-10-CM

## 2017-05-03 DIAGNOSIS — N76.0 BV (BACTERIAL VAGINOSIS): ICD-10-CM

## 2017-05-03 DIAGNOSIS — B96.89 BV (BACTERIAL VAGINOSIS): ICD-10-CM

## 2017-05-03 PROCEDURE — 99999 PR PBB SHADOW E&M-EST. PATIENT-LVL II: CPT | Mod: PBBFAC,,, | Performed by: MIDWIFE

## 2017-05-03 PROCEDURE — 0502F SUBSEQUENT PRENATAL CARE: CPT | Mod: S$GLB,,, | Performed by: MIDWIFE

## 2017-05-03 RX ORDER — METRONIDAZOLE 7.5 MG/G
1 GEL VAGINAL DAILY
Qty: 70 G | Refills: 0 | Status: SHIPPED | OUTPATIENT
Start: 2017-05-03 | End: 2017-08-23

## 2017-05-03 NOTE — MR AVS SNAPSHOT
[facility]  [address]   [place] [address]  Phone: [phone]                  Modesta Smith   5/3/2017 2:45 PM   Routine Prenatal    Description:  Female : 1990   Provider:  Dequan Webb CNM   Department:  [facility]           Reason for Visit     Routine Prenatal Visit           Diagnoses this Visit        Comments    Obesity during pregnancy in first trimester    -  Primary     BV (bacterial vaginosis)         12 weeks gestation of pregnancy                To Do List           Future Appointments        Provider Department Dept Phone    5/3/2017 2:45 PM Dequan Webb CNM [facility]--[phone]    2017 9:00 AM Dequan Webb CNM [facility]--[phone]    2017 9:10 AM [facility] LABORATORY [facility] [phone]      Goals (5 Years of Data)     None      Follow-Up and Disposition     Return in about 4 weeks (around 2017).    Follow-up and Disposition History       These Medications        Disp Refills Start End    metronidazole (METROGEL) 0.75 % vaginal gel 70 g 0 5/3/2017     Place 1 applicator vaginally once daily. - Vaginal    [facility]acy: [facility] [address] [place] [address] [id number] [place] [address] #: [phone]         [facility]     [facility] On Call Nurse Care Line -  Assistance  Unless otherwise directed by your provider, please contact [facility], our nurse care line that is available for  assistance.     Registered nurses in the [facility] provide: appointment scheduling, clinical advisement, health education, and other advisory services.  Call: [phone] (toll free)               Medications           Message regarding Medications     Verify the changes and/or additions to your medication regime listed below are the same as discussed with your clinician today.  If any of these changes or additions are incorrect, please notify your healthcare provider.         START taking these NEW medications        Refills    metronidazole (METROGEL) 0.75 % vaginal gel 0    Sig: Place 1 applicator vaginally once daily.    Class: Normal    Route: Vaginal           Verify that the below list of medications is an accurate representation of the medications you are currently taking.  If none reported, the list may be blank. If incorrect, please contact your healthcare provider. Carry this list with you in case of emergency.           Current Medications     metronidazole (METROGEL) 0.75 % vaginal gel Place 1 applicator vaginally once daily.    PNV NO.122/IRON/FOLIC ACID (PRENATAL MULTI ORAL) Take 1 tablet by mouth once daily at 6am.           Clinical Reference Information           Prenatal Vitals     Enc. Date GA Prenatal Vitals Prenatal Pulse Pain Level Urine Albumin/Glucose Edema Presentation Dilation/Effacement/Station    5/3/17 12w4d 120/90 (A) / 108.9 kg (240 lb 1.3 oz)  / 141  0  None / None / None / No      17 8w4d 122/78 / 107.4 kg (236 lb 12.4 oz)  / 173    None / None / None / No         TW.944 kg (2 lb 1.3 oz)   Pregravid weight: 108 kg (238 lb)   Number of fetuses: 1       Your Vitals Were     BP Weight Last Period BMI       120/90 108.9 kg (240 lb 1.3 oz) 2017 (Exact Date) 41.21 kg/m2       Allergies as of 5/3/2017     No Known Allergies      Immunizations Administered on Date of Encounter - 5/3/2017     None      Orders Placed During Today's Visit     Future Labs/Procedures Expected by Expires    Maternal Quad Screen  5/3/2017 2018      Language Assistance Services     ATTENTION: Language assistance services are available, free of charge. Please call [phone].      ATENCIÓN: Si habla español, tiene a monique disposición servicios gratuitos de asistencia lingüística. Llame al [phone].     CHÚ Ý: N?u b?n nói Ti?ng Vi?t, có các d?ch v? h? tr? ngôn ng? mi?n phí dành cho b?n. G?i s? [phone].         [facility] complies with applicable  Federal civil rights laws and does not discriminate on the basis of race, color, national origin, age, disability, or sex.

## 2017-05-03 NOTE — TELEPHONE ENCOUNTER
----- Message from Jean-Paul Moreno sent at 5/3/2017 11:31 AM CDT -----  Contact: pt   States she is calling rg wanting to be worked in earlier for appt today due to the weather and can be reached at [phone]

## 2017-05-03 NOTE — PROGRESS NOTES
Complaints today: brown discharge with odor    BP (!) 120/90  Wt 108.9 kg (240 lb 1.3 oz)  LMP 2017 (Exact Date)  BMI 41.21 kg/m2    26 y.o., at 12w4d by Estimated Date of Delivery: 17  Patient Active Problem List   Diagnosis    Obesity    Rh negative status during pregnancy    Sickle cell trait    History of     Obesity during pregnancy in first trimester     OB History    Para Term  AB SAB TAB Ectopic Multiple Living   3 2 2       2      # Outcome Date GA Lbr Chong/2nd Weight Sex Delivery Anes PTL Lv   3 Current            2 Term 13 40w0d  3.997 kg (8 lb 13 oz) F CS-Unspec Spinal N Y      Birth Comments: WOMANS   1 Term 07 40w0d 13:00 3.997 kg (8 lb 13 oz) M CS-LTranv EPI  Y      Complications: Failure to Progress in First Stage      Obstetric Comments   Elective INDX, did not dilate past 8cm       Dating reviewed    Allergies and problem list reviewed and updated    Medical and surgical history reviewed    Prenatal labs reviewed and updated    Physical Exam:  ABD: soft, gravid, non tender, obese    Assessment:  Obesity in pregnancy    Plan:   Quad at [place]   follow up 4 Weeks, kick counts, labor precautions

## 2017-06-01 ENCOUNTER — LAB VISIT (OUTPATIENT)
Dept: LAB | Facility: HOSPITAL | Age: 27
End: 2017-06-01
Attending: MIDWIFE
Payer: COMMERCIAL

## 2017-06-01 ENCOUNTER — ROUTINE PRENATAL (OUTPATIENT)
Dept: OBSTETRICS AND GYNECOLOGY | Facility: CLINIC | Age: 27
End: 2017-06-01
Payer: COMMERCIAL

## 2017-06-01 VITALS
BODY MASS INDEX: 41.93 KG/M2 | SYSTOLIC BLOOD PRESSURE: 122 MMHG | WEIGHT: 244.25 LBS | DIASTOLIC BLOOD PRESSURE: 84 MMHG

## 2017-06-01 DIAGNOSIS — Z3A.16 16 WEEKS GESTATION OF PREGNANCY: ICD-10-CM

## 2017-06-01 DIAGNOSIS — Z67.91 RH NEGATIVE STATUS DURING PREGNANCY, SECOND TRIMESTER: ICD-10-CM

## 2017-06-01 DIAGNOSIS — O99.212 OBESITY DURING PREGNANCY IN SECOND TRIMESTER: Primary | ICD-10-CM

## 2017-06-01 DIAGNOSIS — O99.211 OBESITY DURING PREGNANCY IN FIRST TRIMESTER: ICD-10-CM

## 2017-06-01 DIAGNOSIS — D57.3 SICKLE CELL TRAIT: ICD-10-CM

## 2017-06-01 DIAGNOSIS — Z36.4 ANTENATAL SCREENING FOR FETAL GROWTH RETARDATION USING ULTRASONICS: ICD-10-CM

## 2017-06-01 DIAGNOSIS — O26.892 RH NEGATIVE STATUS DURING PREGNANCY, SECOND TRIMESTER: ICD-10-CM

## 2017-06-01 PROCEDURE — 99999 PR PBB SHADOW E&M-EST. PATIENT-LVL II: CPT | Mod: PBBFAC,,, | Performed by: MIDWIFE

## 2017-06-01 PROCEDURE — 81511 FTL CGEN ABNOR FOUR ANAL: CPT

## 2017-06-01 PROCEDURE — 0502F SUBSEQUENT PRENATAL CARE: CPT | Mod: S$GLB,,, | Performed by: MIDWIFE

## 2017-06-01 PROCEDURE — 36415 COLL VENOUS BLD VENIPUNCTURE: CPT | Mod: PO

## 2017-06-01 NOTE — PROGRESS NOTES
Complaints today: none    /84   Wt 110.8 kg (244 lb 4.3 oz)   LMP 2017 (Exact Date)   BMI 41.93 kg/m²     26 y.o., at 16w5d by Estimated Date of Delivery: 17  Patient Active Problem List   Diagnosis    Obesity    Rh negative status during pregnancy    Sickle cell trait    History of     Obesity during pregnancy in first trimester     OB History    Para Term  AB Living   3 2 2   2   SAB TAB Ectopic Multiple Live Births       2      # Outcome Date GA Lbr Chong/2nd Weight Sex Delivery Anes PTL Lv   3 Current            2 Term 13 40w0d  3.997 kg (8 lb 13 oz) F CS-Unspec Spinal N JASPER      Birth Comments: WOMANS   1 Term 07 40w0d 13:00 3.997 kg (8 lb 13 oz) M CS-LTranv EPI  JASPER      Complications: Failure to Progress in First Stage      Obstetric Comments   Elective INDX, did not dilate past 8cm       Dating reviewed    Allergies and problem list reviewed and updated    Medical and surgical history reviewed    Prenatal labs reviewed and updated    Physical Exam:  ABD: soft, gravid, nontender, obese    Assessment:  SS trait  Obesity    Plan:   Anatomy US at [place]  Quad screen today   follow up 4 Weeks

## 2017-06-05 LAB
ALPHA FETOPROTEIN MATERNAL: 25 NG/ML
DOWN RISK (<1:270): NORMAL
ETHNIC ORIGIN: NORMAL
GA METHOD: NORMAL
GESTATIONAL AGE (DAYS): 5
GESTATIONAL AGE (WEEKS): 16
HUMAN CHORIONIC GONADOTROPIN: 20.7 IU/ML
INHIBIN A: 106.7 PG/ML
INSULIN DEPEND. DIABETES: NORMAL
M.O.M. ALPHA FETOPROTEIN: 0.84
M.O.M. HCG: 0.78
M.O.M. INHIBIN A: 0.85
M.O.M. UNCONJ. ESTRIOL: 1.5
MATERNAL AGE AT EDD (YRS): 29
MATERNAL AGE FOR DOWN: NORMAL
MATERNAL WEIGHT (LBS): 240
MULTIPLE GESTATIONS: NORMAL
QUAD SCREEN INTERPRETATION: NORMAL
QUAD SCREEN: NEGATIVE
TRISOMY 18 (<1:100): NORMAL
UNCONJUGATED ESTRIOL: 1.13 NG/ML

## 2017-06-28 ENCOUNTER — PROCEDURE VISIT (OUTPATIENT)
Dept: OBSTETRICS AND GYNECOLOGY | Facility: CLINIC | Age: 27
End: 2017-06-28
Payer: COMMERCIAL

## 2017-06-28 ENCOUNTER — ROUTINE PRENATAL (OUTPATIENT)
Dept: OBSTETRICS AND GYNECOLOGY | Facility: CLINIC | Age: 27
End: 2017-06-28
Payer: COMMERCIAL

## 2017-06-28 VITALS
DIASTOLIC BLOOD PRESSURE: 78 MMHG | SYSTOLIC BLOOD PRESSURE: 110 MMHG | BODY MASS INDEX: 42.84 KG/M2 | WEIGHT: 249.56 LBS

## 2017-06-28 DIAGNOSIS — O26.892 RH NEGATIVE STATUS DURING PREGNANCY, SECOND TRIMESTER: ICD-10-CM

## 2017-06-28 DIAGNOSIS — Z36.4 ANTENATAL SCREENING FOR FETAL GROWTH RETARDATION USING ULTRASONICS: ICD-10-CM

## 2017-06-28 DIAGNOSIS — Z36.3 ENCOUNTER FOR ROUTINE SCREENING FOR MALFORMATION USING ULTRASONICS: ICD-10-CM

## 2017-06-28 DIAGNOSIS — Z98.891 HISTORY OF C-SECTION: ICD-10-CM

## 2017-06-28 DIAGNOSIS — D57.3 SICKLE CELL TRAIT: ICD-10-CM

## 2017-06-28 DIAGNOSIS — Z67.91 RH NEGATIVE STATUS DURING PREGNANCY, SECOND TRIMESTER: ICD-10-CM

## 2017-06-28 DIAGNOSIS — O99.212 OBESITY DURING PREGNANCY IN SECOND TRIMESTER: Primary | ICD-10-CM

## 2017-06-28 PROCEDURE — 99999 PR PBB SHADOW E&M-EST. PATIENT-LVL II: CPT | Mod: PBBFAC,,, | Performed by: MIDWIFE

## 2017-06-28 PROCEDURE — 0502F SUBSEQUENT PRENATAL CARE: CPT | Mod: S$GLB,,, | Performed by: MIDWIFE

## 2017-06-28 PROCEDURE — 76805 OB US >/= 14 WKS SNGL FETUS: CPT | Mod: S$GLB,,, | Performed by: OBSTETRICS & GYNECOLOGY

## 2017-06-28 NOTE — PROGRESS NOTES
Complaints today: none    /78   Wt 113.2 kg (249 lb 9 oz)   LMP 2017 (Exact Date)   BMI 42.84 kg/m²     27 y.o., at 20w4d by Estimated Date of Delivery: 17  Patient Active Problem List   Diagnosis    Obesity    Rh negative status during pregnancy    Sickle cell trait    History of     Obesity during pregnancy in second trimester     OB History    Para Term  AB Living   3 2 2     2   SAB TAB Ectopic Multiple Live Births           2      # Outcome Date GA Lbr Chong/2nd Weight Sex Delivery Anes PTL Lv   3 Current            2 Term 13 40w0d  3.997 kg (8 lb 13 oz) F CS-Unspec Spinal N JASPER      Birth Comments: WOMANS   1 Term 07 40w0d 13:00 3.997 kg (8 lb 13 oz) M CS-LTranv EPI  JASPER      Complications: Failure to Progress in First Stage      Obstetric Comments   Elective INDX, did not dilate past 8cm       Dating reviewed    Allergies and problem list reviewed and updated    Medical and surgical history reviewed    Prenatal labs reviewed and updated    Physical Exam:  ABD: soft, gravid, nontender,obese  US: Sub opt 4 chamber view, spine, and diaphragm, all other anatomy visualized and normal    Assessment:  Obesity in pregnancy    Plan:      follow up 4 Weeks

## 2017-07-26 ENCOUNTER — PROCEDURE VISIT (OUTPATIENT)
Dept: OBSTETRICS AND GYNECOLOGY | Facility: CLINIC | Age: 27
End: 2017-07-26
Payer: COMMERCIAL

## 2017-07-26 ENCOUNTER — ROUTINE PRENATAL (OUTPATIENT)
Dept: OBSTETRICS AND GYNECOLOGY | Facility: CLINIC | Age: 27
End: 2017-07-26
Payer: COMMERCIAL

## 2017-07-26 VITALS — DIASTOLIC BLOOD PRESSURE: 88 MMHG | SYSTOLIC BLOOD PRESSURE: 120 MMHG | WEIGHT: 255.5 LBS | BODY MASS INDEX: 43.86 KG/M2

## 2017-07-26 DIAGNOSIS — Z98.891 HISTORY OF C-SECTION: Primary | ICD-10-CM

## 2017-07-26 DIAGNOSIS — O26.892 RH NEGATIVE STATUS DURING PREGNANCY, SECOND TRIMESTER: ICD-10-CM

## 2017-07-26 DIAGNOSIS — Z3A.24 24 WEEKS GESTATION OF PREGNANCY: ICD-10-CM

## 2017-07-26 DIAGNOSIS — Z67.91 RH NEGATIVE STATUS DURING PREGNANCY, SECOND TRIMESTER: ICD-10-CM

## 2017-07-26 PROCEDURE — 99999 PR PBB SHADOW E&M-EST. PATIENT-LVL II: CPT | Mod: PBBFAC,,, | Performed by: MIDWIFE

## 2017-07-26 PROCEDURE — 0502F SUBSEQUENT PRENATAL CARE: CPT | Mod: S$GLB,,, | Performed by: MIDWIFE

## 2017-07-26 PROCEDURE — 76816 OB US FOLLOW-UP PER FETUS: CPT | Mod: S$GLB,,, | Performed by: OBSTETRICS & GYNECOLOGY

## 2017-07-26 NOTE — PROGRESS NOTES
Complaints today: wants to check urine for UTI, had burning w/urination 2 weeks ago which resolved    LMP 2017 (Exact Date)     27 y.o., at 24w4d by Estimated Date of Delivery: 17  Patient Active Problem List   Diagnosis    Obesity    Rh negative status during pregnancy    Sickle cell trait    History of     Obesity during pregnancy in second trimester     OB History    Para Term  AB Living   3 2 2     2   SAB TAB Ectopic Multiple Live Births           2      # Outcome Date GA Lbr Chong/2nd Weight Sex Delivery Anes PTL Lv   3 Current            2 Term 13 40w0d  3.997 kg (8 lb 13 oz) F CS-Unspec Spinal N JASPER      Birth Comments: WOMANS   1 Term 07 40w0d 13:00 3.997 kg (8 lb 13 oz) M CS-LTranv EPI  JASPER      Complications: Failure to Progress in First Stage      Obstetric Comments   Elective INDX, did not dilate past 8cm       Dating reviewed    Allergies and problem list reviewed and updated    Medical and surgical history reviewed    Prenatal labs reviewed and updated    Physical Exam:  ABD: soft, gravid, nontender, obese  US: Sub optimal views of sacral spine, all other anatomy seen and normal  Urine dipstick: trace protein    Assessment:  Obesity in pregnancy    Plan:   T3 labs next visit  Tdap next visit   follow up 4 Weeks, kick counts, labor precautions     HA Sarkar

## 2017-08-23 ENCOUNTER — TELEPHONE (OUTPATIENT)
Dept: OBSTETRICS AND GYNECOLOGY | Facility: CLINIC | Age: 27
End: 2017-08-23

## 2017-08-23 ENCOUNTER — LAB VISIT (OUTPATIENT)
Dept: LAB | Facility: HOSPITAL | Age: 27
End: 2017-08-23
Attending: MIDWIFE
Payer: COMMERCIAL

## 2017-08-23 ENCOUNTER — ROUTINE PRENATAL (OUTPATIENT)
Dept: OBSTETRICS AND GYNECOLOGY | Facility: CLINIC | Age: 27
End: 2017-08-23
Payer: COMMERCIAL

## 2017-08-23 VITALS
WEIGHT: 263.69 LBS | DIASTOLIC BLOOD PRESSURE: 86 MMHG | BODY MASS INDEX: 45.26 KG/M2 | SYSTOLIC BLOOD PRESSURE: 130 MMHG

## 2017-08-23 DIAGNOSIS — O99.810 ABNORMAL O'SULLIVAN GLUCOSE CHALLENGE TEST, ANTEPARTUM: ICD-10-CM

## 2017-08-23 DIAGNOSIS — Z23 NEED FOR TDAP VACCINATION: ICD-10-CM

## 2017-08-23 DIAGNOSIS — Z3A.28 28 WEEKS GESTATION OF PREGNANCY: ICD-10-CM

## 2017-08-23 DIAGNOSIS — O99.213 OBESITY DURING PREGNANCY IN THIRD TRIMESTER: Primary | ICD-10-CM

## 2017-08-23 DIAGNOSIS — Z3A.24 24 WEEKS GESTATION OF PREGNANCY: ICD-10-CM

## 2017-08-23 LAB
ABO + RH BLD: NORMAL
BASOPHILS # BLD AUTO: 0.02 K/UL
BASOPHILS NFR BLD: 0.2 %
BLD GP AB SCN CELLS X3 SERPL QL: NORMAL
DIFFERENTIAL METHOD: ABNORMAL
EOSINOPHIL # BLD AUTO: 0.1 K/UL
EOSINOPHIL NFR BLD: 0.7 %
ERYTHROCYTE [DISTWIDTH] IN BLOOD BY AUTOMATED COUNT: 14.3 %
GLUCOSE SERPL-MCNC: 198 MG/DL
HCT VFR BLD AUTO: 37.8 %
HGB BLD-MCNC: 13.3 G/DL
LYMPHOCYTES # BLD AUTO: 2 K/UL
LYMPHOCYTES NFR BLD: 22.6 %
MCH RBC QN AUTO: 27.6 PG
MCHC RBC AUTO-ENTMCNC: 35.2 G/DL
MCV RBC AUTO: 78 FL
MONOCYTES # BLD AUTO: 0.3 K/UL
MONOCYTES NFR BLD: 3.7 %
NEUTROPHILS # BLD AUTO: 6.5 K/UL
NEUTROPHILS NFR BLD: 72.4 %
PLATELET # BLD AUTO: 234 K/UL
PMV BLD AUTO: 9.8 FL
RBC # BLD AUTO: 4.82 M/UL
WBC # BLD AUTO: 8.91 K/UL

## 2017-08-23 PROCEDURE — 0502F SUBSEQUENT PRENATAL CARE: CPT | Mod: S$GLB,,, | Performed by: MIDWIFE

## 2017-08-23 PROCEDURE — 86592 SYPHILIS TEST NON-TREP QUAL: CPT

## 2017-08-23 PROCEDURE — 86900 BLOOD TYPING SEROLOGIC ABO: CPT

## 2017-08-23 PROCEDURE — 82950 GLUCOSE TEST: CPT | Mod: PO

## 2017-08-23 PROCEDURE — 86703 HIV-1/HIV-2 1 RESULT ANTBDY: CPT

## 2017-08-23 PROCEDURE — 99999 PR PBB SHADOW E&M-EST. PATIENT-LVL III: CPT | Mod: PBBFAC,,, | Performed by: MIDWIFE

## 2017-08-23 PROCEDURE — 86901 BLOOD TYPING SEROLOGIC RH(D): CPT

## 2017-08-23 PROCEDURE — 90471 IMMUNIZATION ADMIN: CPT | Mod: S$GLB,,, | Performed by: MIDWIFE

## 2017-08-23 PROCEDURE — 90715 TDAP VACCINE 7 YRS/> IM: CPT | Mod: S$GLB,,, | Performed by: MIDWIFE

## 2017-08-23 PROCEDURE — 85025 COMPLETE CBC W/AUTO DIFF WBC: CPT | Mod: PO

## 2017-08-23 NOTE — PROGRESS NOTES
Complaints today: T3 complaints    /86   Wt 119.6 kg (263 lb 10.7 oz)   LMP 2017 (Exact Date)   BMI 45.26 kg/m²     27 y.o., at 28w4d by Estimated Date of Delivery: 17  Patient Active Problem List   Diagnosis    Obesity    Rh negative status during pregnancy    Sickle cell trait    History of     Obesity during pregnancy in third trimester     OB History    Para Term  AB Living   3 2 2     2   SAB TAB Ectopic Multiple Live Births           2      # Outcome Date GA Lbr Chong/2nd Weight Sex Delivery Anes PTL Lv   3 Current            2 Term 13 40w0d  3.997 kg (8 lb 13 oz) F CS-Unspec Spinal N JASPER      Birth Comments: WOMANS   1 Term 07 40w0d 13:00 3.997 kg (8 lb 13 oz) M CS-LTranv EPI  JASPER      Complications: Failure to Progress in First Stage      Obstetric Comments   Elective INDX, did not dilate past 8cm       Dating reviewed    Allergies and problem list reviewed and updated    Medical and surgical history reviewed    Prenatal labs reviewed and updated    Physical Exam:  ABD: soft, gravid, nontender, obese    Assessment:  Obesity in pregnancy  Rh negative    Plan:   TDAP today  T3 labs today  Rhogam at [place]   follow up 2 Weeks, kick counts, labor precautions

## 2017-08-23 NOTE — TELEPHONE ENCOUNTER
----- Message from Dequan Webb CNM sent at 8/23/2017  1:53 PM CDT -----  Please call pt and schedule a 3 hour GTT. I sent a portal message so make sure she read it prior to scheduling the appt. She did not pass her glucose test.

## 2017-08-23 NOTE — PATIENT INSTRUCTIONS
Understanding  Labor  Going into labor before your 37th week of pregnancy is called  labor.  labor can cause your baby to be born too soon. This can lead to a number of health problems that may affect your baby.     Before labor, the cervix is thick and closed.       In  labor, the cervix begins to efface (thin) and dilate (open).      Symptoms of  labor  If you believe youre having  labor, get medical help right away. Contractions alone dont mean youre in  labor. What matters more are changes in your cervix (the lower end of the uterus). Symptoms of  labor include:  · Four or more contractions per hour  · Strong contractions  · Constant menstrual-like cramping  · Low-back pain  · Mucous or bloody vaginal discharge  · Bleeding or spotting in the second or third trimester  Evaluating  labor  Your healthcare provider will try to find out whether youre in  labor or whether youre just having contractions. He or she may watch you for a few hours. The following tests may be done:  · Pelvic exam to see if your cervix has effaced (thinned) and dilated (opened)  · Uterine activity monitoring to detect contractions  · Fetal monitoring to check the health of your baby  · Ultrasound to check your babys size and position  · Amniocentesis to check how mature your babys lungs are  Caring for yourself at home  If you have  contractions, but your cervix is still thick and closed, your healthcare provider may ask you to do the following at home:  · Drink plenty of water.  · Do fewer activities.  · Rest in bed on your side.  · Avoid intercourse and nipple stimulation.  When to call your healthcare provider  Call your healthcare provider if you notice any of these:  · Four or more contractions per hour  · Bag of water breaks  · Bleeding or spotting   If you need hospital care   labor often requires that you have hospital care and complete bed  rest. You may have an IV (intravenous) line to get fluids. You may be given pills or injections to help prevent contractions. Finally, you may receive medicine (corticosteroids) that helps your babys lungs mature more rapidly.  Are you at risk?  Any pregnant woman can have  labor. It may start for no reason. But these risk factors can increase your chances:  · Past  labor or past early birth  · Smoking, drug, or alcohol use during pregnancy  · Multiple fetuses (twins or more)  · Problems with the shape of the uterus  · Bleeding during the pregnancy  The dangers of  birth  A baby born too soon may have health problems. This is because the baby didnt have enough time to mature. Some of the risks for your baby include:  · Not breastfeeding or feeding well  · Having immature lungs  · Bleeding in the brain  · Dying  Reaching term  Your goal is to get as close to term as you can before giving birth. The closer you get to term, the higher your chance of having a healthy baby. Work with your healthcare provider. Together, you can take steps that may keep you from giving birth too early.  Date Last Reviewed: 2016  © 0190-0047 "[employer]. [address] [place] [address]. All rights reserved. This information is not intended as a substitute for professional medical care. Always follow your healthcare professional's instructions.        Premature Labor    Premature labor ( labor) is when symptoms of labor occur before 37 weeks of pregnancy. (This is 3 weeks before your due date.) Premature labor can lead to premature delivery. This means giving birth to your baby early. Babies need at least 37 weeks of pregnancy for all the organs to develop normally. The earlier the delivery, the greater the risks to the baby.  In most cases, the cause of premature labor is unknown. But certain factors may make the problem more likely. These include:  · History of premature labor with  other pregnancies  · Smoking  · Alcohol or substance abuse  · Low pre-pregnancy weight or weight gain during pregnancy  · Short time period between pregnancies  · Being pregnant with twins, triplets, or more  · History of certain types of surgery on the cervix or uterus  · Having a short cervix  · Certain infections  There are a number of other risk factors. Ask your healthcare provider to help you understand the risk factors specific to your case. Then find out what you can do to control or reduce them.  Contractions are one of the main signs of premature labor. A contraction is different from cramping. It may feel painful and the belly (abdomen) may get hard. It can last from a few seconds to a few minutes. Some women may feel only a sense of pressure in the belly, thighs, rectum, or vagina. Some may feel only the hardening of the uterus without pain or pressure. Or there may be a constant pain the lower back, which spreads forward toward the belly.    Premature labor can often be treated with medicines. A hospital stay will likely be needed. If labor is stopped successfully and you and your baby are both healthy, you may be discharged to continue care at home.  Home care  · Ask your provider any questions you have. Be certain you understand how to care for yourself at home. Also follow all recommendations given by your healthcare providers.  · Learn the signs of premature labor. Watch for these signs when you get home.  · Limit or restrict activities as advised. This may include stopping certain physical activities and cutting back hours at work.  · Avoid doing any strenuous work. Ask family and friends for help with tasks and support at home, if needed.  · Dont smoke, drink alcohol, or use other harmful substances.  · Take steps to reduce stress.  · Report any unusual symptoms to your provider.  Follow-up care  Follow up with your healthcare provider, or as directed. Weekly visits with your provider may be  needed.  When to seek medical advice  Call your healthcare provider right away if any of these occur:  · Regular or frequent contractions, whether they are painful or not  · Pressure in the pelvis  · Pressure in the lower belly or mild cramping in your belly with or without diarrhea  · Constant low, dull backache  · Gush or slow leaking of water from your vagina  · Change in vaginal discharge (watery, mucus, or bloody)  · Any vaginal bleeding  · Decreased movement of your baby  Date Last Reviewed: 9/26/2015 © 2000-2016 [employer]. [address] [place] [address]. All rights reserved. This information is not intended as a substitute for professional medical care. Always follow your healthcare professional's instructions.

## 2017-08-24 ENCOUNTER — LAB VISIT (OUTPATIENT)
Dept: LAB | Facility: HOSPITAL | Age: 27
End: 2017-08-24
Attending: MIDWIFE
Payer: COMMERCIAL

## 2017-08-24 DIAGNOSIS — O99.810 ABNORMAL O'SULLIVAN GLUCOSE CHALLENGE TEST, ANTEPARTUM: ICD-10-CM

## 2017-08-24 DIAGNOSIS — O24.410 DIET CONTROLLED GESTATIONAL DIABETES MELLITUS (GDM) IN THIRD TRIMESTER: Primary | ICD-10-CM

## 2017-08-24 LAB
GLUCOSE SERPL-MCNC: 101 MG/DL
GLUCOSE SERPL-MCNC: 172 MG/DL
GLUCOSE SERPL-MCNC: 237 MG/DL
GLUCOSE SERPL-MCNC: 59 MG/DL
HIV 1+2 AB+HIV1 P24 AG SERPL QL IA: NEGATIVE
RPR SER QL: NORMAL

## 2017-08-24 PROCEDURE — 82951 GLUCOSE TOLERANCE TEST (GTT): CPT | Mod: PO

## 2017-08-24 PROCEDURE — 36415 COLL VENOUS BLD VENIPUNCTURE: CPT | Mod: PO

## 2017-08-25 ENCOUNTER — PATIENT MESSAGE (OUTPATIENT)
Dept: OBSTETRICS AND GYNECOLOGY | Facility: CLINIC | Age: 27
End: 2017-08-25

## 2017-08-25 ENCOUNTER — TELEPHONE (OUTPATIENT)
Dept: OBSTETRICS AND GYNECOLOGY | Facility: CLINIC | Age: 27
End: 2017-08-25

## 2017-08-30 ENCOUNTER — CLINICAL SUPPORT (OUTPATIENT)
Dept: DIABETES | Facility: CLINIC | Age: 27
End: 2017-08-30
Payer: COMMERCIAL

## 2017-08-30 VITALS — HEIGHT: 64 IN | WEIGHT: 257.06 LBS | BODY MASS INDEX: 43.89 KG/M2

## 2017-08-30 DIAGNOSIS — O24.410 GESTATIONAL DIABETES MELLITUS, CLASS A1: Primary | ICD-10-CM

## 2017-08-30 PROCEDURE — 99999 PR PBB SHADOW E&M-EST. PATIENT-LVL III: CPT | Mod: PBBFAC,,, | Performed by: DIETITIAN, REGISTERED

## 2017-08-30 PROCEDURE — G0108 DIAB MANAGE TRN  PER INDIV: HCPCS | Mod: S$GLB,,, | Performed by: DIETITIAN, REGISTERED

## 2017-08-30 NOTE — PROGRESS NOTES
Diabetes Education  Author: Netta Cuevas RD, CDE  Date: 8/30/2017    Diabetes Education Visit  Diabetes Education Record Assessment/Progress: Initial    Diabetes Type  Diabetes Type : Gestational    Diabetes History  Diabetes Diagnosis: 0-1 year (~29wks)    Nutrition  Meal Planning:  (0582-0886 cals/d. Pt not limiting carb, fat or sodium.)  Meal Plan 24 Hour Recall - Breakfast: pancakes, sausage, eggs, costa OR cinnamon roll OR froot loops - oj  Meal Plan 24 Hour Recall - Lunch: egg sandwich w/ costa, cheese (white bread), chips - water  Meal Plan 24 Hour Recall - Dinner: fried chix, beans - water  Meal Plan 24 Hour Recall - Snack: am: homar butter cookies, lemon pie, cinnamon roll    Monitoring   Self Monitoring : needs meter    Exercise   Frequency: Never    Current Diabetes Treatment   Current Treatment: Diet, Exercise    Social History  Preferred Learning Method: Face to Face  Primary Support: Self  Occupation: . Housewife.  Smoking Status: Never a Smoker  Alcohol Use: Never     Barriers to Change  Barriers to Change: None  Learning Challenges : None    Readiness to Learn   Readiness to Learn : Eager    Cultural Influences  Cultural Influences: No    Diabetes Education Assessment/Progress  Acute Complications (preventing, detecting, and treating acute complications): Discussion, Individual Session, Competent (verbalizes/demonstrates), Written Materials Provided  Chronic Complications (preventing, detecting, and treating chronic complications): Discussion, Individual Session, Competent (verbalizes/demonstrates), Written Materials Provided  Diabetes Disease Process (diabetes disease process and treatment options): Discussion, Individual Session, Competent (verbalizes/demonstrates), Written Materials Provided  Nutrition (Incorporating nutritional management into one's lifestyle): Discussion, Individual Session, Competent (verbalizes/demonstrates), Written Materials Provided  Physical Activity  (incorporating physical activity into one's lifestyle): Discussion, Individual Session, Competent (verbalizes/demonstrates), Written Materials Provided  Medications (states correct name, dose, onset, peak, duration, side effects & timing of meds): Discussion, Individual Session, Competent (verbalizes/demonstrates), Written Materials Provided  Monitoring (monitoring blood glucose/other parameters & using results): Discussion, Individual Session, Competent (verbalizes/demonstrates)  Goal Setting and Problem Solving (verbalizes behavior change strategies & sets realistic goals): Discussion, Individual Session, Competent (verbalizes/demonstrates), Written Materials Provided  Behavior Change (developing personal strategies to health & behavior change): Discussion, Individual Session, Comnpetent (verbalizes/demonstrates), Written Materials Provided  Psychosocial Issues (developing personal srategies to address psychosocial concerns): Discussion, Individual Session, Competent (verbalizes/demonstrates)    Goals  Healthy Eating: Set (use meal plan)  Start Date: 08/30/17  Target Date: 09/13/17  Physical Activity: Set (150min/wk)  Start Date: 08/30/17  Target Date: 09/13/17  Monitoring: Set (test BG 4x/d (fst, 2 hr pp))  Start Date: 09/13/17  Target Date: 09/13/17    Diabetes Self-Management Support Plan  Diabetes Learning:  (RD/CDE 2wks or sooner prn mychart/phone)  Review Status: Patient has selected and agrees to support plan.    Diabetes Care Plan/Intervention  Education Plan/Intervention: Individual Follow-Up DSMT    Diabetes Meal Plan  Restrictions: Low Fat, Low Sodium  Calories: 1800  Carbohydrate Per Meal: 30-45g  Carbohydrate Per Snack : 7-15g, 15-20g  Protein: 90grm/d    Education Units of Time   Time Spent: 60 min      Health Maintenance Topics with due status: Not Due       Topic Last Completion Date    TETANUS VACCINE 08/23/2017     Health Maintenance Due   Topic Date Due    Lipid Panel  1988     Pneumococcal PCV13 (High Risk) (1 - PCV13 Required) 06/07/1989    Pneumococcal PPSV23 (High Risk) (1) 06/07/2006    Pap Smear  04/01/2016    Influenza Vaccine  08/01/2017

## 2017-08-30 NOTE — LETTER
August 30, 2017        Dequan Webb, SILVIO  [address] [facility] [place] [address]             [facility]  [address] [facility] [place] [address]  Phone: [phone]  Fax: [phone]   Patient: Modesta Smith   MR Number: [id number]   YOB: 1990   Date of Visit: 8/30/2017       Dear Dr. Webb:    Thank you for referring Modesta Smith to me for evaluation. Below are the relevant portions of my assessment and plan of care.    If you have questions, please do not hesitate to call me. I look forward to following Modesta along with you.    Sincerely,      Netta Cuevas, RD, CDE           CC  No Recipients

## 2017-08-31 ENCOUNTER — PATIENT MESSAGE (OUTPATIENT)
Dept: DIABETES | Facility: CLINIC | Age: 27
End: 2017-08-31

## 2017-08-31 RX ORDER — LANCETS 33 GAUGE
1 EACH MISCELLANEOUS 4 TIMES DAILY
Qty: 100 EACH | Refills: 11 | Status: ON HOLD | OUTPATIENT
Start: 2017-08-31 | End: 2017-10-29 | Stop reason: HOSPADM

## 2017-08-31 RX ORDER — INSULIN PUMP SYRINGE, 3 ML
EACH MISCELLANEOUS
Qty: 1 EACH | Refills: 0 | Status: ON HOLD | OUTPATIENT
Start: 2017-08-31 | End: 2017-10-29 | Stop reason: HOSPADM

## 2017-08-31 NOTE — TELEPHONE ENCOUNTER
----- Message from Tala Wu sent at 8/31/2017  8:37 AM CDT -----  Contact: pt  The pt request a call concerning her diabetic supplies, pt can be reached at [phone]///[id number]

## 2017-09-13 ENCOUNTER — ROUTINE PRENATAL (OUTPATIENT)
Dept: OBSTETRICS AND GYNECOLOGY | Facility: CLINIC | Age: 27
End: 2017-09-13
Payer: COMMERCIAL

## 2017-09-13 VITALS
DIASTOLIC BLOOD PRESSURE: 80 MMHG | SYSTOLIC BLOOD PRESSURE: 120 MMHG | WEIGHT: 256.63 LBS | BODY MASS INDEX: 44.05 KG/M2

## 2017-09-13 DIAGNOSIS — Z3A.31 31 WEEKS GESTATION OF PREGNANCY: ICD-10-CM

## 2017-09-13 DIAGNOSIS — O99.213 OBESITY DURING PREGNANCY IN THIRD TRIMESTER: ICD-10-CM

## 2017-09-13 DIAGNOSIS — Z98.891 HISTORY OF C-SECTION: ICD-10-CM

## 2017-09-13 DIAGNOSIS — O24.410 DIET CONTROLLED GESTATIONAL DIABETES MELLITUS (GDM) IN THIRD TRIMESTER: Primary | ICD-10-CM

## 2017-09-13 PROCEDURE — 99999 PR PBB SHADOW E&M-EST. PATIENT-LVL II: CPT | Mod: PBBFAC,,, | Performed by: MIDWIFE

## 2017-09-13 PROCEDURE — 0502F SUBSEQUENT PRENATAL CARE: CPT | Mod: S$GLB,,, | Performed by: MIDWIFE

## 2017-09-13 NOTE — PROGRESS NOTES
Complaints today: Lots of 's, saw DM educator 2 weeks ago, following diet and monitoring BS QID, doing well with it, also walking for exercise most days    /80   Wt 116.4 kg (256 lb 9.9 oz)   LMP 2017 (Exact Date)   BMI 44.05 kg/m²     27 y.o., at 31w4d by Estimated Date of Delivery: 17  Patient Active Problem List   Diagnosis    Obesity    Rh negative status during pregnancy    Sickle cell trait    History of     Obesity during pregnancy in third trimester    Diet controlled gestational diabetes mellitus (GDM) in third trimester     OB History    Para Term  AB Living   3 2 2     2   SAB TAB Ectopic Multiple Live Births           2      # Outcome Date GA Lbr Chong/2nd Weight Sex Delivery Anes PTL Lv   3 Current            2 Term 13 40w0d  3.997 kg (8 lb 13 oz) F CS-Unspec Spinal N JASPER      Birth Comments: WOMANS   1 Term 07 40w0d 13:00 3.997 kg (8 lb 13 oz) M CS-LTranv EPI  JASPER      Complications: Failure to Progress in First Stage      Obstetric Comments   Elective INDX, did not dilate past 8cm       Dating reviewed    Allergies and problem list reviewed and updated    Medical and surgical history reviewed    Prenatal labs reviewed and updated    Physical Exam:  ABD: soft, gravid, nontender, obese    Assessment:  Obesity in pregnancy  GDM    Plan:   Weekly BPP starting next week  Diet controlled GDM   follow up 1 Weeks, kick counts, labor precautions     HA Sarkar

## 2017-09-14 ENCOUNTER — NUTRITION (OUTPATIENT)
Dept: DIABETES | Facility: CLINIC | Age: 27
End: 2017-09-14
Payer: COMMERCIAL

## 2017-09-14 VITALS — BODY MASS INDEX: 43.44 KG/M2 | HEIGHT: 64 IN | WEIGHT: 254.44 LBS

## 2017-09-14 DIAGNOSIS — O24.410 GESTATIONAL DIABETES MELLITUS, CLASS A1: Primary | ICD-10-CM

## 2017-09-14 PROCEDURE — G0108 DIAB MANAGE TRN  PER INDIV: HCPCS | Mod: PBBFAC,PO | Performed by: DIETITIAN, REGISTERED

## 2017-09-14 PROCEDURE — 99213 OFFICE O/P EST LOW 20 MIN: CPT | Mod: PBBFAC,PO | Performed by: DIETITIAN, REGISTERED

## 2017-09-14 PROCEDURE — 99999 PR PBB SHADOW E&M-EST. PATIENT-LVL III: CPT | Mod: PBBFAC,,, | Performed by: DIETITIAN, REGISTERED

## 2017-09-14 NOTE — PROGRESS NOTES
Diabetes Education  Author: Netta Cuevas RD, CDE  Date: 9/14/2017    Diabetes Education Visit  Diabetes Education Record Assessment/Progress: Comprehensive/Group    Diabetes Type  Diabetes Type : Gestational (31.5 wks)     Nutrition  Meal Planning:  (~2200 cals/d. Pt is limiting carb, fat and sodium. She stopped using juices and regular sugar-containing snacks. )    Monitoring   Self Monitoring : Per records, fst BG 84-95, 97; 2 hr ppb ; 2hr ppl ; 2hr ppd 82-99, 120    Exercise   Exercise Type:  (Walking 15-20 min 3d/wk)    Current Diabetes Treatment   Current Treatment: Diet, Exercise    Social History  Preferred Learning Method: Face to Face  Primary Support: Self, Spouse  Smoking Status: Never a Smoker  Alcohol Use: Never    Barriers to Change  Barriers to Change: None  Learning Challenges : None    Readiness to Learn   Readiness to Learn : Eager    Cultural Influences  Cultural Influences: No    Diabetes Education Assessment/Progress  Acute Complications (preventing, detecting, and treating acute complications): Not Applicable  Chronic Complications (preventing, detecting, and treating chronic complications): Not Applicable  Diabetes Disease Process (diabetes disease process and treatment options): Not Applicable  Nutrition (Incorporating nutritional management into one's lifestyle): Discussion, Individual Session, Competent (verbalizes/demonstrates), Competent Family/SO, Return Demonstration  Physical Activity (incorporating physical activity into one's lifestyle): Discussion, Individual Session, Competent (verbalizes/demonstrates), Competent Family/SO, Return Demonstration  Medications (states correct name, dose, onset, peak, duration, side effects & timing of meds): Not Applicable  Monitoring (monitoring blood glucose/other parameters & using results): Discussion, Individual Session, Competent (verbalizes/demonstrates), Return Demonstration, Competent Family/SO  Goal Setting and Problem  Solving (verbalizes behavior change strategies & sets realistic goals): Discussion, Individual Session, Competent (verbalizes/demonstrates), Competent Family/SO  Behavior Change (developing personal strategies to health & behavior change): Discussion, Individual Session, Comnpetent (verbalizes/demonstrates), Return Demonstration, Competent Family/SO  Psychosocial Issues (developing personal srategies to address psychosocial concerns): Discussion, Individual Session, Competent (verbalizes/demonstrates), Return Demonstration, Competent Family/SO    Goals  Healthy Eating: % Met (use meal plan)  Met Percentage : 100%  Start Date: 09/14/17 (HS snack daily)  Target Date: 10/05/17  Physical Activity: In Progress (150min/wk)  Start Date: 09/14/17  Target Date: 10/05/17  Monitoring: % Met (test BG 4x/d (fst, 2 hr pp))  Met Percentage : 100%    Diabetes Self-Management Support Plan  Diabetes Learning:  (RD/CDE via [employer] prn, 2-3 wk fu)  Review Status: Patient has selected and agrees to support plan.    Diabetes Care Plan/Intervention  Education Plan/Intervention: Individual Follow-Up DSMT    Diabetes Meal Plan  Restrictions: Low Fat, Low Sodium  Calories: 1800  Carbohydrate Per Meal: 30-45g  Carbohydrate Per Snack : 7-15g, 15-20g  Protein: 90grm/d    Education Units of Time   Time Spent: 30 min      Health Maintenance Topics with due status: Not Due       Topic Last Completion Date    TETANUS VACCINE 08/23/2017     Health Maintenance Due   Topic Date Due    Lipid Panel  1988    Pneumococcal PCV13 (High Risk) (1 - PCV13 Required) 06/07/1989    Pneumococcal PPSV23 (High Risk) (1) 06/07/2006    Pap Smear  04/01/2016    Influenza Vaccine  08/01/2017

## 2017-09-14 NOTE — LETTER
September 14, 2017        Dequan Webb, SILVIO  [address] [facility] [place] [address]             [facility]  [address] [facility] [place] [address]  Phone: [phone]  Fax: [phone]   Patient: Modesta Smith   MR Number: [id number]   YOB: 1990   Date of Visit: 9/14/2017       Dear Dr. Webb:    Thank you for referring Modesta Smith to me for evaluation. Below are the relevant portions of my assessment and plan of care.    If you have questions, please do not hesitate to call me. I look forward to following Modesta along with you.    Sincerely,      Netta Cuevas, RD, CDE           CC  No Recipients

## 2017-09-20 ENCOUNTER — ROUTINE PRENATAL (OUTPATIENT)
Dept: OBSTETRICS AND GYNECOLOGY | Facility: CLINIC | Age: 27
End: 2017-09-20
Payer: COMMERCIAL

## 2017-09-20 ENCOUNTER — PROCEDURE VISIT (OUTPATIENT)
Dept: OBSTETRICS AND GYNECOLOGY | Facility: CLINIC | Age: 27
End: 2017-09-20
Payer: COMMERCIAL

## 2017-09-20 VITALS
WEIGHT: 254.63 LBS | SYSTOLIC BLOOD PRESSURE: 124 MMHG | BODY MASS INDEX: 43.71 KG/M2 | DIASTOLIC BLOOD PRESSURE: 74 MMHG

## 2017-09-20 DIAGNOSIS — Z98.891 HISTORY OF C-SECTION: ICD-10-CM

## 2017-09-20 DIAGNOSIS — O24.410 DIET CONTROLLED GESTATIONAL DIABETES MELLITUS (GDM) IN THIRD TRIMESTER: Primary | ICD-10-CM

## 2017-09-20 DIAGNOSIS — O26.893 RH NEGATIVE STATE IN ANTEPARTUM PERIOD, THIRD TRIMESTER: ICD-10-CM

## 2017-09-20 DIAGNOSIS — O24.410 DIET CONTROLLED GESTATIONAL DIABETES MELLITUS (GDM) IN THIRD TRIMESTER: ICD-10-CM

## 2017-09-20 DIAGNOSIS — O99.213 OBESITY DURING PREGNANCY IN THIRD TRIMESTER: ICD-10-CM

## 2017-09-20 DIAGNOSIS — Z67.91 RH NEGATIVE STATUS DURING PREGNANCY, THIRD TRIMESTER: ICD-10-CM

## 2017-09-20 DIAGNOSIS — Z67.91 RH NEGATIVE STATE IN ANTEPARTUM PERIOD, THIRD TRIMESTER: ICD-10-CM

## 2017-09-20 DIAGNOSIS — Z3A.32 32 WEEKS GESTATION OF PREGNANCY: ICD-10-CM

## 2017-09-20 DIAGNOSIS — O26.893 RH NEGATIVE STATUS DURING PREGNANCY, THIRD TRIMESTER: ICD-10-CM

## 2017-09-20 PROCEDURE — 76819 FETAL BIOPHYS PROFIL W/O NST: CPT | Mod: S$GLB,,, | Performed by: OBSTETRICS & GYNECOLOGY

## 2017-09-20 PROCEDURE — 0502F SUBSEQUENT PRENATAL CARE: CPT | Mod: S$GLB,,, | Performed by: MIDWIFE

## 2017-09-20 PROCEDURE — 96372 THER/PROPH/DIAG INJ SC/IM: CPT | Mod: S$GLB,,, | Performed by: MIDWIFE

## 2017-09-20 PROCEDURE — 76816 OB US FOLLOW-UP PER FETUS: CPT | Mod: S$GLB,,, | Performed by: OBSTETRICS & GYNECOLOGY

## 2017-09-20 PROCEDURE — 99999 PR PBB SHADOW E&M-EST. PATIENT-LVL II: CPT | Mod: PBBFAC,,, | Performed by: MIDWIFE

## 2017-09-20 NOTE — NURSING
Pt. Received rhogam injection today. Pt. Tolerated well. Instructed patient to wait in clinic for 15 minutes. Pt. Voiced understanding.

## 2017-09-20 NOTE — PROGRESS NOTES
Complaints today: few 's, brought glucose log today, walking and housework for exercise. Fasting BS <95 except 2 at 96. 2hr PP BS <120.    /74   Wt 115.5 kg (254 lb 10.1 oz)   LMP 2017 (Exact Date)   BMI 43.71 kg/m²     27 y.o., at 32w4d by Estimated Date of Delivery: 17  Patient Active Problem List   Diagnosis    Obesity    Rh negative status during pregnancy    Sickle cell trait    History of     Obesity during pregnancy in third trimester    Diet controlled gestational diabetes mellitus (GDM) in third trimester     OB History    Para Term  AB Living   3 2 2     2   SAB TAB Ectopic Multiple Live Births           2      # Outcome Date GA Lbr Chong/2nd Weight Sex Delivery Anes PTL Lv   3 Current            2 Term 13 40w0d  3.997 kg (8 lb 13 oz) F CS-Unspec Spinal N JASPER      Birth Comments: WOMANS   1 Term 07 40w0d 13:00 3.997 kg (8 lb 13 oz) M CS-LTranv EPI  JASPER      Complications: Failure to Progress in First Stage      Obstetric Comments   Elective INDX, did not dilate past 8cm       Dating reviewed    Allergies and problem list reviewed and updated    Medical and surgical history reviewed    Prenatal labs reviewed and updated    Physical Exam:  ABD: soft, gravid, nontender, obese    Assessment:  GDM  Obesity  U/S: BPP 8/8, MVP 4.0, EFW 62%    Plan:   Discussed adjusting bedtime snack to help control fasting blood sugars  Weekly BPP for GDM, obesity   follow up 1 Weeks, kick counts, labor precautions   Rhogam today    HA Sarkar

## 2017-09-27 ENCOUNTER — ROUTINE PRENATAL (OUTPATIENT)
Dept: OBSTETRICS AND GYNECOLOGY | Facility: CLINIC | Age: 27
End: 2017-09-27
Payer: COMMERCIAL

## 2017-09-27 ENCOUNTER — PROCEDURE VISIT (OUTPATIENT)
Dept: OBSTETRICS AND GYNECOLOGY | Facility: CLINIC | Age: 27
End: 2017-09-27
Payer: COMMERCIAL

## 2017-09-27 VITALS — DIASTOLIC BLOOD PRESSURE: 66 MMHG | BODY MASS INDEX: 43.86 KG/M2 | WEIGHT: 255.5 LBS | SYSTOLIC BLOOD PRESSURE: 112 MMHG

## 2017-09-27 DIAGNOSIS — O24.410 DIET CONTROLLED GESTATIONAL DIABETES MELLITUS (GDM) IN THIRD TRIMESTER: Primary | ICD-10-CM

## 2017-09-27 DIAGNOSIS — Z3A.33 33 WEEKS GESTATION OF PREGNANCY: ICD-10-CM

## 2017-09-27 DIAGNOSIS — Z23 FLU VACCINE NEED: ICD-10-CM

## 2017-09-27 DIAGNOSIS — O24.410 DIET CONTROLLED GESTATIONAL DIABETES MELLITUS (GDM) IN THIRD TRIMESTER: ICD-10-CM

## 2017-09-27 DIAGNOSIS — O99.213 OBESITY DURING PREGNANCY IN THIRD TRIMESTER: ICD-10-CM

## 2017-09-27 PROCEDURE — 90686 IIV4 VACC NO PRSV 0.5 ML IM: CPT | Mod: S$GLB,,, | Performed by: MIDWIFE

## 2017-09-27 PROCEDURE — 0502F SUBSEQUENT PRENATAL CARE: CPT | Mod: S$GLB,,, | Performed by: MIDWIFE

## 2017-09-27 PROCEDURE — 90471 IMMUNIZATION ADMIN: CPT | Mod: S$GLB,,, | Performed by: MIDWIFE

## 2017-09-27 PROCEDURE — 76819 FETAL BIOPHYS PROFIL W/O NST: CPT | Mod: S$GLB,,, | Performed by: OBSTETRICS & GYNECOLOGY

## 2017-09-27 PROCEDURE — 99999 PR PBB SHADOW E&M-EST. PATIENT-LVL II: CPT | Mod: PBBFAC,,, | Performed by: MIDWIFE

## 2017-09-27 NOTE — PROGRESS NOTES
After identifying patient with 2 identifiers, verifying that patient wished to receive flu vaccine, reviewing allergies, 0.5 ml Flu quadrivalient administered to left deltoid. Patient tolerated well.  Patient instructed to wait 15 minutes and verbalized understanding.  Next appointment scheduled and AVS printed and given to patient.

## 2017-09-27 NOTE — PROGRESS NOTES
Complaints today: none, BS's at goal    /66   Wt 115.9 kg (255 lb 8.2 oz)   LMP 2017 (Exact Date)   BMI 43.86 kg/m²     27 y.o., at 33w4d by Estimated Date of Delivery: 17  Patient Active Problem List   Diagnosis    Obesity    Rh negative status during pregnancy    Sickle cell trait    History of     Obesity during pregnancy in third trimester    Diet controlled gestational diabetes mellitus (GDM) in third trimester     OB History    Para Term  AB Living   3 2 2     2   SAB TAB Ectopic Multiple Live Births           2      # Outcome Date GA Lbr Chong/2nd Weight Sex Delivery Anes PTL Lv   3 Current            2 Term 13 40w0d  3.997 kg (8 lb 13 oz) F CS-Unspec Spinal N JASPER      Birth Comments: WOMANS   1 Term 07 40w0d 13:00 3.997 kg (8 lb 13 oz) M CS-LTranv EPI  JASPER      Complications: Failure to Progress in First Stage      Obstetric Comments   Elective INDX, did not dilate past 8cm       Dating reviewed    Allergies and problem list reviewed and updated    Medical and surgical history reviewed    Prenatal labs reviewed and updated    Physical Exam:  ABD: soft, gravid, nontender, obese  US: BPP 8/8, MVP 4.9    Assessment:  Obesity in pregnancy   GDM diet controlled    Plan:   US at [facility] Knapp in 2 weeks to discuss repeat c/s, pt concerned at the size of baby already, first baby big and had a c/s   follow up 1 Weeks, kick counts, labor precautions

## 2017-10-05 ENCOUNTER — NUTRITION (OUTPATIENT)
Dept: DIABETES | Facility: CLINIC | Age: 27
End: 2017-10-05
Payer: COMMERCIAL

## 2017-10-05 ENCOUNTER — ROUTINE PRENATAL (OUTPATIENT)
Dept: OBSTETRICS AND GYNECOLOGY | Facility: CLINIC | Age: 27
End: 2017-10-05
Payer: COMMERCIAL

## 2017-10-05 ENCOUNTER — PROCEDURE VISIT (OUTPATIENT)
Dept: OBSTETRICS AND GYNECOLOGY | Facility: CLINIC | Age: 27
End: 2017-10-05
Payer: COMMERCIAL

## 2017-10-05 VITALS — WEIGHT: 252.63 LBS | BODY MASS INDEX: 43.13 KG/M2 | HEIGHT: 64 IN

## 2017-10-05 VITALS
WEIGHT: 254.88 LBS | DIASTOLIC BLOOD PRESSURE: 70 MMHG | BODY MASS INDEX: 43.75 KG/M2 | SYSTOLIC BLOOD PRESSURE: 118 MMHG

## 2017-10-05 DIAGNOSIS — Z3A.34 34 WEEKS GESTATION OF PREGNANCY: ICD-10-CM

## 2017-10-05 DIAGNOSIS — Z98.891 HISTORY OF C-SECTION: ICD-10-CM

## 2017-10-05 DIAGNOSIS — O24.410 DIET CONTROLLED GESTATIONAL DIABETES MELLITUS (GDM) IN THIRD TRIMESTER: ICD-10-CM

## 2017-10-05 DIAGNOSIS — O24.410 GESTATIONAL DIABETES MELLITUS, CLASS A1: Primary | ICD-10-CM

## 2017-10-05 DIAGNOSIS — O24.410 DIET CONTROLLED GESTATIONAL DIABETES MELLITUS (GDM) IN THIRD TRIMESTER: Primary | ICD-10-CM

## 2017-10-05 DIAGNOSIS — O99.213 OBESITY DURING PREGNANCY IN THIRD TRIMESTER: ICD-10-CM

## 2017-10-05 PROCEDURE — 76819 FETAL BIOPHYS PROFIL W/O NST: CPT | Mod: S$GLB,,, | Performed by: OBSTETRICS & GYNECOLOGY

## 2017-10-05 PROCEDURE — G0108 DIAB MANAGE TRN  PER INDIV: HCPCS | Mod: S$GLB,,, | Performed by: DIETITIAN, REGISTERED

## 2017-10-05 PROCEDURE — 0502F SUBSEQUENT PRENATAL CARE: CPT | Mod: S$GLB,,, | Performed by: MIDWIFE

## 2017-10-05 PROCEDURE — 99999 PR PBB SHADOW E&M-EST. PATIENT-LVL III: CPT | Mod: PBBFAC,,, | Performed by: DIETITIAN, REGISTERED

## 2017-10-05 PROCEDURE — 99999 PR PBB SHADOW E&M-EST. PATIENT-LVL II: CPT | Mod: PBBFAC,,, | Performed by: MIDWIFE

## 2017-10-05 NOTE — PROGRESS NOTES
Complaints today: Reports increased vaginal discharge/mucous on Sat, none since then, no other symptoms or complaints. Blood sugars at goal, except some fastings elevated. Reports was testing fasting levels after she had been up for a while, but will start doing it as soon as she gets up, per DM educator advice.     /70   Wt 115.6 kg (254 lb 13.6 oz)   LMP 2017 (Exact Date)   BMI 43.75 kg/m²     27 y.o., at 34w5d by Estimated Date of Delivery: 17  Patient Active Problem List   Diagnosis    Obesity    Rh negative status during pregnancy    Sickle cell trait    History of     Obesity during pregnancy in third trimester    Diet controlled gestational diabetes mellitus (GDM) in third trimester     OB History    Para Term  AB Living   3 2 2     2   SAB TAB Ectopic Multiple Live Births           2      # Outcome Date GA Lbr Chong/2nd Weight Sex Delivery Anes PTL Lv   3 Current            2 Term 13 40w0d  3.997 kg (8 lb 13 oz) F CS-Unspec Spinal N JASPER      Birth Comments: WOMANS   1 Term 07 40w0d 13:00 3.997 kg (8 lb 13 oz) M CS-LTranv EPI  JASPER      Complications: Failure to Progress in First Stage      Obstetric Comments   Elective INDX, did not dilate past 8cm       Dating reviewed    Allergies and problem list reviewed and updated    Medical and surgical history reviewed    Prenatal labs reviewed and updated    Physical Exam:  ABD: soft, gravid, nontender, obese    Assessment:  Obesity in pregnancy  Diet controlled GDM    Plan:   Appt w/Dr Knapp next week  US BPP next week   follow up 1 Weeks, kick counts, labor precautions     HA Sarkar SNM

## 2017-10-05 NOTE — LETTER
October 5, 2017        Dequan Webb, [facility]  [address] [place] [address]             [facility]  [address] [place] [address]  Phone: [phone]  Fax: [phone]   Patient: Modesta Smith   MR Number: [id number]   YOB: 1990   Date of Visit: 10/5/2017       Dear Dr. Webb:    Thank you for referring Modesta Smith to me for evaluation. Below are the relevant portions of my assessment and plan of care.    If you have questions, please do not hesitate to call me. I look forward to following Modesta along with you.    Sincerely,      Netta Cuevas, RD, CDE           CC  No Recipients

## 2017-10-05 NOTE — PROGRESS NOTES
Diabetes Education  Author: Netta Cuevas RD, CDE  Date: 10/5/2017    Diabetes Education Visit  Diabetes Education Record Assessment/Progress: Post Program/Follow-up    Diabetes Type  Diabetes Type : Gestational (35 wks)         Nutrition  Meal Planning:  (~9634-0633 cals/d. Pt is limiting carb, fat and sodium.)  Meal Plan 24 Hour Recall - Breakfast: wheat waffles (blueberry) or toast (wheat), eggs, sausage/turkey costa - milk   Meal Plan 24 Hour Recall - Lunch: chix OR burger (no bun) OR taco salad (no shell) - water  Meal Plan 24 Hour Recall - Dinner: burger w/ wheat bread OR cabbage w/ bkd chix   Meal Plan 24 Hour Recall - Snack: cheese sticks, greek yogurt or pb on crackers or fudge sf popsickle or sf jello     Monitoring   Self Monitoring : Per records, fst BG  (pt testing 2 hr after waking); 2 hr ppb ; 2hr ppl ; 2hr ppd 85-98.  Blood Glucose Logs: Yes    Exercise   Exercise Type:  (Irregular)    Current Diabetes Treatment   Current Treatment: Diet, Exercise    Social History  Preferred Learning Method: Face to Face  Primary Support: Self  Smoking Status: Never a Smoker  Alcohol Use: Never       Barriers to Change  Barriers to Change: None  Learning Challenges : None    Readiness to Learn   Readiness to Learn : Eager    Cultural Influences  Cultural Influences: No    Diabetes Education Assessment/Progress  Acute Complications (preventing, detecting, and treating acute complications): Not Applicable  Chronic Complications (preventing, detecting, and treating chronic complications): Not Applicable  Diabetes Disease Process (diabetes disease process and treatment options): Discussion, Individual Session, Competent (verbalizes/demonstrates), Competent Family/SO  Nutrition (Incorporating nutritional management into one's lifestyle): Discussion, Individual Session, Competent (verbalizes/demonstrates), Competent Family/SO, Return Demonstration  Physical Activity (incorporating physical activity  into one's lifestyle): Discussion, Individual Session, Competent (verbalizes/demonstrates), Competent Family/SO  Medications (states correct name, dose, onset, peak, duration, side effects & timing of meds): Not Applicable  Monitoring (monitoring blood glucose/other parameters & using results): Discussion, Individual Session, Competent (verbalizes/demonstrates), Return Demonstration, Competent Family/SO  Goal Setting and Problem Solving (verbalizes behavior change strategies & sets realistic goals): Discussion, Individual Session, Competent (verbalizes/demonstrates), Competent Family/SO, Return Demonstration  Behavior Change (developing personal strategies to health & behavior change): Discussion, Individual Session, Comnpetent (verbalizes/demonstrates), Return Demonstration, Competent Family/SO  Psychosocial Issues (developing personal srategies to address psychosocial concerns): Discussion, Individual Session, Competent (verbalizes/demonstrates), Return Demonstration, Competent Family/SO    Goals  Healthy Eating: % Met (use meal plan - HS snack daily)  Met Percentage : 75%  Physical Activity: In Progress (150min/wk)  Monitoring: % Met (test BG 4x/d (fst, 2 hr pp))  Met Percentage : 100%    Diabetes Self-Management Support Plan  Diabetes Learning:  (RD/CDE via mychart/phone, as needed)  Review Status: Patient has selected and agrees to support plan.    Diabetes Care Plan/Intervention  Education Plan/Intervention:  (Emphasis on postpartum BG testing, annual diabetes screening and ongoing lifestyle change for diabetes prevention.)    Diabetes Meal Plan  Restrictions: Low Fat, Low Sodium  Calories: 1800  Carbohydrate Per Meal: 30-45g  Carbohydrate Per Snack : 7-15g, 15-20g  Protein: 90grm/d    Education Units of Time   Time Spent: 30 min      Health Maintenance Topics with due status: Not Due       Topic Last Completion Date    TETANUS VACCINE 08/23/2017     Health Maintenance Due   Topic Date Due    Lipid Panel   1988    Pneumococcal PCV13 (High Risk) (1 - PCV13 Required) 06/07/1989    Pneumococcal PPSV23 (High Risk) (1) 06/07/2006    Pap Smear  04/01/2016

## 2017-10-09 ENCOUNTER — ROUTINE PRENATAL (OUTPATIENT)
Dept: OBSTETRICS AND GYNECOLOGY | Facility: CLINIC | Age: 27
End: 2017-10-09
Payer: COMMERCIAL

## 2017-10-09 VITALS — SYSTOLIC BLOOD PRESSURE: 124 MMHG | DIASTOLIC BLOOD PRESSURE: 78 MMHG | BODY MASS INDEX: 43.59 KG/M2 | WEIGHT: 254 LBS

## 2017-10-09 DIAGNOSIS — O99.210 OBESITY COMPLICATING PREGNANCY, CHILDBIRTH, OR PUERPERIUM, ANTEPARTUM: ICD-10-CM

## 2017-10-09 DIAGNOSIS — Z3A.35 PREGNANCY WITH 35 COMPLETED WEEKS GESTATION: ICD-10-CM

## 2017-10-09 DIAGNOSIS — O24.410 DIET CONTROLLED GESTATIONAL DIABETES MELLITUS (GDM), ANTEPARTUM: ICD-10-CM

## 2017-10-09 DIAGNOSIS — Z98.891 H/O: C-SECTION: Primary | ICD-10-CM

## 2017-10-09 PROCEDURE — 99999 PR PBB SHADOW E&M-EST. PATIENT-LVL III: CPT | Mod: PBBFAC,,, | Performed by: OBSTETRICS & GYNECOLOGY

## 2017-10-09 PROCEDURE — 87081 CULTURE SCREEN ONLY: CPT

## 2017-10-09 PROCEDURE — 0502F SUBSEQUENT PRENATAL CARE: CPT | Mod: S$GLB,,, | Performed by: OBSTETRICS & GYNECOLOGY

## 2017-10-09 NOTE — PROGRESS NOTES
Previous pregnancies 8lb infants but no h/o GDM. GDM diet controlled this pregnancy but LGA by leopold. Still considering . Desires flu vaccine

## 2017-10-11 ENCOUNTER — PROCEDURE VISIT (OUTPATIENT)
Dept: OBSTETRICS AND GYNECOLOGY | Facility: CLINIC | Age: 27
End: 2017-10-11
Payer: COMMERCIAL

## 2017-10-11 DIAGNOSIS — O24.410 DIET CONTROLLED GESTATIONAL DIABETES MELLITUS (GDM) IN THIRD TRIMESTER: ICD-10-CM

## 2017-10-11 LAB — BACTERIA SPEC AEROBE CULT: NORMAL

## 2017-10-11 PROCEDURE — 76819 FETAL BIOPHYS PROFIL W/O NST: CPT | Mod: S$GLB,,, | Performed by: OBSTETRICS & GYNECOLOGY

## 2017-10-11 PROCEDURE — 76816 OB US FOLLOW-UP PER FETUS: CPT | Mod: S$GLB,,, | Performed by: OBSTETRICS & GYNECOLOGY

## 2017-10-18 ENCOUNTER — PROCEDURE VISIT (OUTPATIENT)
Dept: OBSTETRICS AND GYNECOLOGY | Facility: CLINIC | Age: 27
End: 2017-10-18
Payer: COMMERCIAL

## 2017-10-18 ENCOUNTER — ROUTINE PRENATAL (OUTPATIENT)
Dept: OBSTETRICS AND GYNECOLOGY | Facility: CLINIC | Age: 27
End: 2017-10-18
Payer: COMMERCIAL

## 2017-10-18 VITALS
DIASTOLIC BLOOD PRESSURE: 78 MMHG | SYSTOLIC BLOOD PRESSURE: 124 MMHG | BODY MASS INDEX: 43.93 KG/M2 | WEIGHT: 255.94 LBS

## 2017-10-18 DIAGNOSIS — Z67.91 RH NEGATIVE STATUS DURING PREGNANCY IN THIRD TRIMESTER: ICD-10-CM

## 2017-10-18 DIAGNOSIS — O24.410 DIET CONTROLLED GESTATIONAL DIABETES MELLITUS (GDM) IN THIRD TRIMESTER: Primary | ICD-10-CM

## 2017-10-18 DIAGNOSIS — O99.213 OBESITY DURING PREGNANCY IN THIRD TRIMESTER: ICD-10-CM

## 2017-10-18 DIAGNOSIS — O24.410 DIET CONTROLLED GESTATIONAL DIABETES MELLITUS (GDM) IN THIRD TRIMESTER: ICD-10-CM

## 2017-10-18 DIAGNOSIS — Z3A.36 36 WEEKS GESTATION OF PREGNANCY: ICD-10-CM

## 2017-10-18 DIAGNOSIS — Z98.891 HISTORY OF C-SECTION: ICD-10-CM

## 2017-10-18 DIAGNOSIS — O26.893 RH NEGATIVE STATUS DURING PREGNANCY IN THIRD TRIMESTER: ICD-10-CM

## 2017-10-18 PROCEDURE — 0502F SUBSEQUENT PRENATAL CARE: CPT | Mod: S$GLB,,, | Performed by: MIDWIFE

## 2017-10-18 PROCEDURE — 76819 FETAL BIOPHYS PROFIL W/O NST: CPT | Mod: S$GLB,,, | Performed by: OBSTETRICS & GYNECOLOGY

## 2017-10-18 PROCEDURE — 99999 PR PBB SHADOW E&M-EST. PATIENT-LVL II: CPT | Mod: PBBFAC,,, | Performed by: MIDWIFE

## 2017-10-18 NOTE — PROGRESS NOTES
Complaints today: Increased pelvic pressure, occasional cxn's. No LOF or bleeding. Walking daily for exercise. Blood sugars at goal.    /78   Wt 116.1 kg (255 lb 15.3 oz)   LMP 2017 (Exact Date)   BMI 43.93 kg/m²     27 y.o., at 36w4d by Estimated Date of Delivery: 17  Patient Active Problem List   Diagnosis    Obesity    Rh negative status during pregnancy    Sickle cell trait    History of     Obesity during pregnancy in third trimester    Diet controlled gestational diabetes mellitus (GDM) in third trimester     OB History    Para Term  AB Living   3 2 2     2   SAB TAB Ectopic Multiple Live Births           2      # Outcome Date GA Lbr Chong/2nd Weight Sex Delivery Anes PTL Lv   3 Current            2 Term 13 40w0d  3.997 kg (8 lb 13 oz) F CS-Unspec Spinal N JASPER      Birth Comments: WOMANS   1 Term 07 40w0d 13:00 3.997 kg (8 lb 13 oz) M CS-LTranv EPI  JASPER      Complications: Failure to Progress in First Stage      Obstetric Comments   Elective INDX, did not dilate past 8cm       Dating reviewed    Allergies and problem list reviewed and updated    Medical and surgical history reviewed    Prenatal labs reviewed and updated    Physical Exam:  ABD: soft, gravid, nontender, obese    Assessment:  Diet controlled GDM  Obesity in pregnancy    Plan:   Weekly US   follow up 1 Weeks, kick counts, labor precautions     KEITH Felix, HA

## 2017-10-21 ENCOUNTER — HOSPITAL ENCOUNTER (OUTPATIENT)
Facility: HOSPITAL | Age: 27
Discharge: HOME | End: 2017-10-21
Attending: OBSTETRICS & GYNECOLOGY | Admitting: OBSTETRICS & GYNECOLOGY
Payer: COMMERCIAL

## 2017-10-21 VITALS
RESPIRATION RATE: 18 BRPM | DIASTOLIC BLOOD PRESSURE: 79 MMHG | WEIGHT: 250 LBS | HEIGHT: 63 IN | SYSTOLIC BLOOD PRESSURE: 121 MMHG | TEMPERATURE: 99 F | HEART RATE: 97 BPM | BODY MASS INDEX: 44.3 KG/M2

## 2017-10-21 DIAGNOSIS — O47.9 THREATENED LABOR AT TERM: ICD-10-CM

## 2017-10-21 LAB — POCT GLUCOSE: 83 MG/DL (ref 70–110)

## 2017-10-21 PROCEDURE — 59025 FETAL NON-STRESS TEST: CPT

## 2017-10-21 PROCEDURE — 96372 THER/PROPH/DIAG INJ SC/IM: CPT

## 2017-10-21 PROCEDURE — 99211 OFF/OP EST MAY X REQ PHY/QHP: CPT | Mod: 25

## 2017-10-21 PROCEDURE — 63600175 PHARM REV CODE 636 W HCPCS: Performed by: ADVANCED PRACTICE MIDWIFE

## 2017-10-21 PROCEDURE — G0378 HOSPITAL OBSERVATION PER HR: HCPCS

## 2017-10-21 PROCEDURE — 99213 OFFICE O/P EST LOW 20 MIN: CPT | Mod: ,,, | Performed by: ADVANCED PRACTICE MIDWIFE

## 2017-10-21 RX ORDER — MORPHINE SULFATE 10 MG/ML
10 INJECTION INTRAMUSCULAR; INTRAVENOUS; SUBCUTANEOUS ONCE
Status: COMPLETED | OUTPATIENT
Start: 2017-10-21 | End: 2017-10-21

## 2017-10-21 RX ORDER — HYDROXYZINE HYDROCHLORIDE 25 MG/ML
50 INJECTION, SOLUTION INTRAMUSCULAR ONCE
Status: COMPLETED | OUTPATIENT
Start: 2017-10-21 | End: 2017-10-21

## 2017-10-21 RX ORDER — ACETAMINOPHEN 500 MG
500 TABLET ORAL EVERY 6 HOURS PRN
Status: DISCONTINUED | OUTPATIENT
Start: 2017-10-21 | End: 2017-10-21 | Stop reason: HOSPADM

## 2017-10-21 RX ORDER — ONDANSETRON 8 MG/1
8 TABLET, ORALLY DISINTEGRATING ORAL EVERY 8 HOURS PRN
Status: DISCONTINUED | OUTPATIENT
Start: 2017-10-21 | End: 2017-10-21 | Stop reason: HOSPADM

## 2017-10-21 RX ADMIN — HYDROXYZINE HYDROCHLORIDE 50 MG: 25 INJECTION, SOLUTION INTRAMUSCULAR at 09:10

## 2017-10-21 RX ADMIN — MORPHINE SULFATE 10 MG: 10 INJECTION, SOLUTION INTRAMUSCULAR; INTRAVENOUS at 09:10

## 2017-10-21 NOTE — PROGRESS NOTES
Pt ambulated, repeat SVE after 2 hours of observation, no change 1 cm/70% per RN, Cat 1 tracing, UC q 3-6 min, declines repeat c/s desires TOLAC  A: early vs false labor, 37 wks gestation  P: discussed findings, early vs false labor, IV bolus and therapeutic rest, pt and  agree with plan

## 2017-10-21 NOTE — DISCHARGE SUMMARY
[facility] -   Obstetrics  Discharge Summary      Patient Name: Modesta Smith  MRN: [id number]  Admission Date: 10/21/2017  Hospital Length of Stay: 0 days  Discharge Date and Time:  10/21/2017 8:53 AM  Attending Physician: Savanah Bee, *   Discharging Provider: Susanne Hernandez CNM  Primary Care Provider: Dequan Webb CNM    HPI: 27 year old  presents with complaint of contractions  Hx c section x 2. Desires MARQUEZ    * No surgery found *     Hospital Course:   Observe for labor  SVE  EFM/TOCO  No cervical change after 2 hours of observation, po hydration and therapeutic rest advised        Final Active Diagnoses:    Diagnosis Date Noted POA    PRINCIPAL PROBLEM:  Threatened labor at term [O47.9] 10/21/2017 Unknown      Problems Resolved During this Admission:    Diagnosis Date Noted Date Resolved POA        Labs: All labs within the past 24 hours have been reviewed    Feeding Method: NA    Immunizations     None          This patient has no babies on file.  Pending Diagnostic Studies:     None          Discharged Condition: good    Disposition: Home or Self Care    Follow Up:  Follow-up Information     Please follow up.    Why:  As needed               Patient Instructions:     Diet general       Medications:  Current Discharge Medication List      CONTINUE these medications which have NOT CHANGED    Details   blood sugar diagnostic Strp 1 strip by Misc.(Non-Drug; Combo Route) route 6 (six) times daily. To check BG 4 times daily to use with insurance preferred meter.  Qty: 100 strip, Refills: 11      blood-glucose meter kit To check BG 4 times daily to use with insurance preferred meter.  Qty: 1 each, Refills: 0      lancets 33 gauge Misc 1 lancet by Misc.(Non-Drug; Combo Route) route 4 (four) times daily. To check BG 4 times daily to use with insurance preferred meter.  Qty: 100 each, Refills: 11      PNV NO.122/IRON/FOLIC ACID (PRENATAL MULTI ORAL) Take 1 tablet by mouth once daily  at 6am.             Susanne Hernandez CNM  Obstetrics  [facility]

## 2017-10-21 NOTE — SUBJECTIVE & OBJECTIVE
Obstetric HPI:  Patient reports irregular contractions, active fetal movement, No vaginal bleeding , No loss of fluid     This pregnancy has been complicated by AB negative blood type    Obstetric History       T2      L2     SAB0   TAB0   Ectopic0   Multiple0   Live Births2       # Outcome Date GA Lbr Chong/2nd Weight Sex Delivery Anes PTL Lv   3 Current            2 Term 13 40w0d  3.997 kg (8 lb 13 oz) F CS-Unspec Spinal N JASPER   1 Term 07 40w0d 13:00 3.997 kg (8 lb 13 oz) M CS-LTranv EPI  JASPER      Name: edy      Complications: Failure to Progress in First Stage      Obstetric Comments   Elective INDX, did not dilate past 8cm     Past Medical History:   Diagnosis Date    sickle cell trait      Past Surgical History:   Procedure Laterality Date     SECTION      X2    MOUTH SURGERY         PTA Medications   Medication Sig    blood sugar diagnostic Strp 1 strip by Misc.(Non-Drug; Combo Route) route 6 (six) times daily. To check BG 4 times daily to use with insurance preferred meter.    blood-glucose meter kit To check BG 4 times daily to use with insurance preferred meter.    lancets 33 gauge Misc 1 lancet by Misc.(Non-Drug; Combo Route) route 4 (four) times daily. To check BG 4 times daily to use with insurance preferred meter.    PNV NO.122/IRON/FOLIC ACID (PRENATAL MULTI ORAL) Take 1 tablet by mouth once daily at 6am.       Review of patient's allergies indicates:  No Known Allergies     Family History     Problem Relation (Age of Onset)    Diabetes Other    Hypertension Other, Mother, Father        Social History Main Topics    Smoking status: Never Smoker    Smokeless tobacco: Never Used    Alcohol use No    Drug use: No    Sexual activity: Yes     Partners: Male     Birth control/ protection: OCP     Review of Systems   Constitutional: Negative.    Respiratory: Negative.    Cardiovascular: Negative.    Endocrine: Negative.    Skin:  Negative.   Neurological:  Negative.       Objective:     Vital Signs (Most Recent):    Vital Signs (24h Range):           There is no height or weight on file to calculate BMI.    FHT: 140's Cat 1 (reassuring)  TOCO:  Q 2-4 minutes    Physical Exam:   Constitutional: She is oriented to person, place, and time. She appears well-developed and well-nourished.      Neck: Normal range of motion.     Pulmonary/Chest: Effort normal.        Abdominal: Soft.     Genitourinary: Vagina normal.           Musculoskeletal: Normal range of motion.       Neurological: She is alert and oriented to person, place, and time.    Skin: Skin is dry.    Psychiatric: She has a normal mood and affect.       Cervix:  Dilation:  1  Effacement:  75%        Significant Labs:  Lab Results   Component Value Date    GROUPTRH AB NEG 08/23/2017    HEPBSAG Negative 04/05/2017    STREPBCULT No Group B Streptococcus isolated 10/09/2017       I have personallly reviewed all pertinent lab results from the last 24 hours.

## 2017-10-21 NOTE — H&P
[facility] -   Obstetrics  History & Physical    Patient Name: Modesta Smith  MRN: [id number]  Admission Date: 10/21/2017  Primary Care Provider: Dequan Webb CNM    Subjective:     Principal Problem:Threatened labor at term    History of Present Illness:  27 year old  presents with complaint of contractions  Hx c section x 2. Desires MARQUEZ    Obstetric HPI:  Patient reports irregular contractions, active fetal movement, No vaginal bleeding , No loss of fluid     This pregnancy has been complicated by AB negative blood type    Obstetric History       T2      L2     SAB0   TAB0   Ectopic0   Multiple0   Live Births2       # Outcome Date GA Lbr Chong/2nd Weight Sex Delivery Anes PTL Lv   3 Current            2 Term 13 40w0d  3.997 kg (8 lb 13 oz) F CS-Unspec Spinal N JASPER   1 Term 07 40w0d 13:00 3.997 kg (8 lb 13 oz) M CS-LTranv EPI  JASPER      Name: edy      Complications: Failure to Progress in First Stage      Obstetric Comments   Elective INDX, did not dilate past 8cm     Past Medical History:   Diagnosis Date    sickle cell trait      Past Surgical History:   Procedure Laterality Date     SECTION      X2    MOUTH SURGERY         PTA Medications   Medication Sig    blood sugar diagnostic Strp 1 strip by Misc.(Non-Drug; Combo Route) route 6 (six) times daily. To check BG 4 times daily to use with insurance preferred meter.    blood-glucose meter kit To check BG 4 times daily to use with insurance preferred meter.    lancets 33 gauge Misc 1 lancet by Misc.(Non-Drug; Combo Route) route 4 (four) times daily. To check BG 4 times daily to use with insurance preferred meter.    PNV NO.122/IRON/FOLIC ACID (PRENATAL MULTI ORAL) Take 1 tablet by mouth once daily at 6am.       Review of patient's allergies indicates:  No Known Allergies     Family History     Problem Relation (Age of Onset)    Diabetes Other    Hypertension Other, Mother, Father        Social  History Main Topics    Smoking status: Never Smoker    Smokeless tobacco: Never Used    Alcohol use No    Drug use: No    Sexual activity: Yes     Partners: Male     Birth control/ protection: OCP     Review of Systems   Constitutional: Negative.    Respiratory: Negative.    Cardiovascular: Negative.    Endocrine: Negative.    Skin:  Negative.   Neurological: Negative.       Objective:     Vital Signs (Most Recent):    Vital Signs (24h Range):           There is no height or weight on file to calculate BMI.    FHT: 140's Cat 1 (reassuring)  TOCO:  Q 2-4 minutes    Physical Exam:   Constitutional: She is oriented to person, place, and time. She appears well-developed and well-nourished.      Neck: Normal range of motion.     Pulmonary/Chest: Effort normal.        Abdominal: Soft.     Genitourinary: Vagina normal.           Musculoskeletal: Normal range of motion.       Neurological: She is alert and oriented to person, place, and time.    Skin: Skin is dry.    Psychiatric: She has a normal mood and affect.       Cervix:  Dilation:  1  Effacement:  75%        Significant Labs:  Lab Results   Component Value Date    GROUPTRH AB NEG 2017    HEPBSAG Negative 2017    STREPBCULT No Group B Streptococcus isolated 10/09/2017       I have personallly reviewed all pertinent lab results from the last 24 hours.    Assessment/Plan:     27 y.o. female  at 37w0d for:    * Threatened labor at term    EFM/TOCO  SVE  Observe for labor            Ca Cai CNM  Obstetrics  [facility]

## 2017-10-21 NOTE — HOSPITAL COURSE
Observe for labor  SVE  EFM/TOCO  No cervical change after 2 hours of observation, po hydration and therapeutic rest advised

## 2017-10-23 ENCOUNTER — ROUTINE PRENATAL (OUTPATIENT)
Dept: OBSTETRICS AND GYNECOLOGY | Facility: CLINIC | Age: 27
End: 2017-10-23
Payer: COMMERCIAL

## 2017-10-23 VITALS
DIASTOLIC BLOOD PRESSURE: 80 MMHG | BODY MASS INDEX: 45.38 KG/M2 | SYSTOLIC BLOOD PRESSURE: 114 MMHG | WEIGHT: 256.19 LBS

## 2017-10-23 DIAGNOSIS — Z98.891 H/O: C-SECTION: Primary | ICD-10-CM

## 2017-10-23 DIAGNOSIS — O99.210 OBESITY COMPLICATING PREGNANCY, CHILDBIRTH, OR PUERPERIUM, ANTEPARTUM: ICD-10-CM

## 2017-10-23 PROCEDURE — 99999 PR PBB SHADOW E&M-EST. PATIENT-LVL II: CPT | Mod: PBBFAC,,, | Performed by: OBSTETRICS & GYNECOLOGY

## 2017-10-23 PROCEDURE — 0502F SUBSEQUENT PRENATAL CARE: CPT | Mod: S$GLB,,, | Performed by: OBSTETRICS & GYNECOLOGY

## 2017-10-23 NOTE — PROGRESS NOTES
Seen in L&D few days ago, would like cervical recheck. Desires . Membranes stripped. 1.5-2cm, soft, 60%

## 2017-10-24 ENCOUNTER — PROCEDURE VISIT (OUTPATIENT)
Dept: OBSTETRICS AND GYNECOLOGY | Facility: CLINIC | Age: 27
End: 2017-10-24
Payer: COMMERCIAL

## 2017-10-24 DIAGNOSIS — O24.410 DIET CONTROLLED GESTATIONAL DIABETES MELLITUS (GDM) IN THIRD TRIMESTER: ICD-10-CM

## 2017-10-24 PROCEDURE — 76819 FETAL BIOPHYS PROFIL W/O NST: CPT | Mod: S$GLB,,, | Performed by: OBSTETRICS & GYNECOLOGY

## 2017-10-25 ENCOUNTER — PATIENT MESSAGE (OUTPATIENT)
Dept: OBSTETRICS AND GYNECOLOGY | Facility: CLINIC | Age: 27
End: 2017-10-25

## 2017-10-26 ENCOUNTER — HOSPITAL ENCOUNTER (INPATIENT)
Facility: HOSPITAL | Age: 27
LOS: 3 days | Discharge: HOME | End: 2017-10-29
Attending: OBSTETRICS & GYNECOLOGY | Admitting: OBSTETRICS & GYNECOLOGY
Payer: COMMERCIAL

## 2017-10-26 ENCOUNTER — ANESTHESIA EVENT (OUTPATIENT)
Dept: OBSTETRICS AND GYNECOLOGY | Facility: HOSPITAL | Age: 27
End: 2017-10-26
Payer: COMMERCIAL

## 2017-10-26 ENCOUNTER — ANESTHESIA (OUTPATIENT)
Dept: OBSTETRICS AND GYNECOLOGY | Facility: HOSPITAL | Age: 27
End: 2017-10-26
Payer: COMMERCIAL

## 2017-10-26 DIAGNOSIS — Z98.891 S/P REPEAT LOW TRANSVERSE C-SECTION: ICD-10-CM

## 2017-10-26 DIAGNOSIS — O42.90 PROM (PREMATURE RUPTURE OF MEMBRANES): ICD-10-CM

## 2017-10-26 LAB
ABO + RH BLD: NORMAL
BASOPHILS # BLD AUTO: 0.01 K/UL
BASOPHILS NFR BLD: 0.1 %
BLD GP AB SCN CELLS X3 SERPL QL: NORMAL
DIFFERENTIAL METHOD: ABNORMAL
EOSINOPHIL # BLD AUTO: 0.1 K/UL
EOSINOPHIL NFR BLD: 0.7 %
ERYTHROCYTE [DISTWIDTH] IN BLOOD BY AUTOMATED COUNT: 14.2 %
HCT VFR BLD AUTO: 39.6 %
HGB BLD-MCNC: 13.7 G/DL
LYMPHOCYTES # BLD AUTO: 2.3 K/UL
LYMPHOCYTES NFR BLD: 28.7 %
MCH RBC QN AUTO: 27.7 PG
MCHC RBC AUTO-ENTMCNC: 34.6 G/DL
MCV RBC AUTO: 80 FL
MONOCYTES # BLD AUTO: 0.6 K/UL
MONOCYTES NFR BLD: 6.9 %
NEUTROPHILS # BLD AUTO: 5.1 K/UL
NEUTROPHILS NFR BLD: 63.6 %
PLATELET # BLD AUTO: 185 K/UL
PMV BLD AUTO: 10.1 FL
POCT GLUCOSE: 67 MG/DL (ref 70–110)
RBC # BLD AUTO: 4.95 M/UL
WBC # BLD AUTO: 8.01 K/UL

## 2017-10-26 PROCEDURE — 85025 COMPLETE CBC W/AUTO DIFF WBC: CPT

## 2017-10-26 PROCEDURE — 27800517 HC TRAY,EPIDURAL-CONTINUOUS: Performed by: NURSE ANESTHETIST, CERTIFIED REGISTERED

## 2017-10-26 PROCEDURE — 86901 BLOOD TYPING SEROLOGIC RH(D): CPT

## 2017-10-26 PROCEDURE — 72100002 HC LABOR CARE, 1ST 8 HOURS

## 2017-10-26 PROCEDURE — 62326 NJX INTERLAMINAR LMBR/SAC: CPT | Performed by: NURSE ANESTHETIST, CERTIFIED REGISTERED

## 2017-10-26 PROCEDURE — 63600175 PHARM REV CODE 636 W HCPCS: Performed by: ADVANCED PRACTICE MIDWIFE

## 2017-10-26 PROCEDURE — 63600175 PHARM REV CODE 636 W HCPCS: Performed by: NURSE ANESTHETIST, CERTIFIED REGISTERED

## 2017-10-26 PROCEDURE — 25000003 PHARM REV CODE 250: Performed by: ADVANCED PRACTICE MIDWIFE

## 2017-10-26 PROCEDURE — 72100003 HC LABOR CARE, EA. ADDL. 8 HRS

## 2017-10-26 PROCEDURE — 11000001 HC ACUTE MED/SURG PRIVATE ROOM

## 2017-10-26 PROCEDURE — 86900 BLOOD TYPING SEROLOGIC ABO: CPT

## 2017-10-26 RX ORDER — ROPIVACAINE HYDROCHLORIDE 2 MG/ML
INJECTION, SOLUTION EPIDURAL; INFILTRATION; PERINEURAL
Status: DISCONTINUED | OUTPATIENT
Start: 2017-10-26 | End: 2017-10-27

## 2017-10-26 RX ORDER — ROPIVACAINE IN 0.9% SOD CHL/PF 0.2 %
PLASTIC BAG, INJECTION (ML) EPIDURAL CONTINUOUS
Status: DISCONTINUED | OUTPATIENT
Start: 2017-10-26 | End: 2017-10-27

## 2017-10-26 RX ORDER — SODIUM CHLORIDE, SODIUM LACTATE, POTASSIUM CHLORIDE, CALCIUM CHLORIDE 600; 310; 30; 20 MG/100ML; MG/100ML; MG/100ML; MG/100ML
INJECTION, SOLUTION INTRAVENOUS CONTINUOUS
Status: DISCONTINUED | OUTPATIENT
Start: 2017-10-26 | End: 2017-10-27

## 2017-10-26 RX ORDER — ONDANSETRON 8 MG/1
8 TABLET, ORALLY DISINTEGRATING ORAL EVERY 8 HOURS PRN
Status: DISCONTINUED | OUTPATIENT
Start: 2017-10-26 | End: 2017-10-27

## 2017-10-26 RX ORDER — ROPIVACAINE HYDROCHLORIDE 2 MG/ML
INJECTION, SOLUTION EPIDURAL; INFILTRATION; PERINEURAL CONTINUOUS PRN
Status: DISCONTINUED | OUTPATIENT
Start: 2017-10-26 | End: 2017-10-27

## 2017-10-26 RX ORDER — OXYTOCIN/RINGER'S LACTATE 20/1000 ML
2 PLASTIC BAG, INJECTION (ML) INTRAVENOUS CONTINUOUS
Status: DISCONTINUED | OUTPATIENT
Start: 2017-10-26 | End: 2017-10-27

## 2017-10-26 RX ADMIN — Medication 2 MILLI-UNITS/MIN: at 07:10

## 2017-10-26 RX ADMIN — ROPIVACAINE HYDROCHLORIDE 14 ML/HR: 2 INJECTION, SOLUTION EPIDURAL; INFILTRATION at 09:10

## 2017-10-26 RX ADMIN — SODIUM CHLORIDE, SODIUM LACTATE, POTASSIUM CHLORIDE, AND CALCIUM CHLORIDE 1000 ML: .6; .31; .03; .02 INJECTION, SOLUTION INTRAVENOUS at 09:10

## 2017-10-26 RX ADMIN — ROPIVACAINE HYDROCHLORIDE 12 ML: 2 INJECTION, SOLUTION EPIDURAL; INFILTRATION at 09:10

## 2017-10-26 RX ADMIN — SODIUM CHLORIDE, SODIUM LACTATE, POTASSIUM CHLORIDE, AND CALCIUM CHLORIDE: 600; 310; 30; 20 INJECTION, SOLUTION INTRAVENOUS at 08:10

## 2017-10-26 NOTE — NURSING
"Discussed feeding choice with mother.  Reviewed benefits of breastfeeding.  Patient given "What to Expect in the First 48 Hours" handout. Mother states her intention is to breastfeeding.    "

## 2017-10-26 NOTE — HOSPITAL COURSE
SROM clear at noon  Pitocin begun  Epidural for comfort    Patient failure to progress after several hours at 6 cm and now 24 hours after PROM.  Patient underwent repeat  on 10/27.  Normal post op course.

## 2017-10-26 NOTE — PLAN OF CARE
Problem: Patient Care Overview  Goal: Plan of Care Review  Outcome: Ongoing (interventions implemented as appropriate)  Pt with OM, FERMINC.  Orders received to start low dose Pitocin.

## 2017-10-26 NOTE — ASSESSMENT & PLAN NOTE
IUP at 37w5d  SROM at 12 noon  Prior c section x2  Desires MARQUEZ  P admit for labor management  Pitocin begun  Epidural placed for comfort

## 2017-10-26 NOTE — SUBJECTIVE & OBJECTIVE
Obstetric HPI:  Patient reports Frequency: Every 3-8 minutes contractions, active fetal movement, No vaginal bleeding , Yes loss of fluid     This pregnancy has been complicated by prior c section x 2,RH negative,sickle cell trait,obesity, diet controlled GD Requesting epidural for comfort    Obstetric History       T2      L2     SAB0   TAB0   Ectopic0   Multiple0   Live Births2       # Outcome Date GA Lbr Chong/2nd Weight Sex Delivery Anes PTL Lv   3 Current            2 Term 13 40w0d  3.997 kg (8 lb 13 oz) F CS-Unspec Spinal N JASPER   1 Term 07 40w0d 13:00 3.997 kg (8 lb 13 oz) M CS-LTranv EPI  JASPER      Name: edy      Complications: Failure to Progress in First Stage      Obstetric Comments   Elective INDX, did not dilate past 8cm     Past Medical History:   Diagnosis Date    sickle cell trait      Past Surgical History:   Procedure Laterality Date     SECTION      X2    MOUTH SURGERY         PTA Medications   Medication Sig    PNV NO.122/IRON/FOLIC ACID (PRENATAL MULTI ORAL) Take 1 tablet by mouth once daily at 6am.    blood sugar diagnostic Strp 1 strip by Misc.(Non-Drug; Combo Route) route 6 (six) times daily. To check BG 4 times daily to use with insurance preferred meter.    blood-glucose meter kit To check BG 4 times daily to use with insurance preferred meter.    lancets 33 gauge Misc 1 lancet by Misc.(Non-Drug; Combo Route) route 4 (four) times daily. To check BG 4 times daily to use with insurance preferred meter.       Review of patient's allergies indicates:  No Known Allergies     Family History     Problem Relation (Age of Onset)    Diabetes Other    Hypertension Other, Mother, Father        Social History Main Topics    Smoking status: Never Smoker    Smokeless tobacco: Never Used    Alcohol use No    Drug use: No    Sexual activity: Yes     Partners: Male     Birth control/ protection: OCP     Review of Systems   Constitutional: Negative.    HENT:  Negative.    Eyes: Negative.    Respiratory: Negative.    Cardiovascular: Negative.    Gastrointestinal: Negative.    Endocrine: Negative.    Genitourinary: Negative.    Musculoskeletal: Negative.    Skin:  Negative.   Neurological: Negative.    Hematological: Negative.    Psychiatric/Behavioral: Negative.    Breast: negative.    All other systems reviewed and are negative.     Objective:     Vital Signs (Most Recent):    Vital Signs (24h Range):           There is no height or weight on file to calculate BMI.    FHT: 144Cat 1 (reassuring)  TOCO:  Q 2-3 minutes    Physical Exam    Cervix:  Dilation:  2      Defer now until epidural placed  Effacement:  75%  Station: -2  Presentation: Vertex     Significant Labs:  Lab Results   Component Value Date    GROUPTRH AB NEG 08/23/2017    HEPBSAG Negative 04/05/2017    STREPBCULT No Group B Streptococcus isolated 10/09/2017       I have personallly reviewed all pertinent lab results from the last 24 hours.

## 2017-10-27 ENCOUNTER — SURGERY (OUTPATIENT)
Age: 27
End: 2017-10-27

## 2017-10-27 PROBLEM — Z98.891 S/P REPEAT LOW TRANSVERSE C-SECTION: Status: ACTIVE | Noted: 2017-10-27

## 2017-10-27 LAB
POCT GLUCOSE: 101 MG/DL (ref 70–110)
POCT GLUCOSE: 81 MG/DL (ref 70–110)
POCT GLUCOSE: 92 MG/DL (ref 70–110)

## 2017-10-27 PROCEDURE — 36000685 HC CESAREAN SECTION LEVEL I

## 2017-10-27 PROCEDURE — 27000181 HC CABLE, IUPC

## 2017-10-27 PROCEDURE — 25000003 PHARM REV CODE 250: Performed by: ADVANCED PRACTICE MIDWIFE

## 2017-10-27 PROCEDURE — 63600175 PHARM REV CODE 636 W HCPCS: Performed by: NURSE ANESTHETIST, CERTIFIED REGISTERED

## 2017-10-27 PROCEDURE — 72100003 HC LABOR CARE, EA. ADDL. 8 HRS

## 2017-10-27 PROCEDURE — 51702 INSERT TEMP BLADDER CATH: CPT

## 2017-10-27 PROCEDURE — 37000009 HC ANESTHESIA EA ADD 15 MINS: Performed by: OBSTETRICS & GYNECOLOGY

## 2017-10-27 PROCEDURE — 63600175 PHARM REV CODE 636 W HCPCS: Performed by: ADVANCED PRACTICE MIDWIFE

## 2017-10-27 PROCEDURE — 37000008 HC ANESTHESIA 1ST 15 MINUTES: Performed by: OBSTETRICS & GYNECOLOGY

## 2017-10-27 PROCEDURE — 25000003 PHARM REV CODE 250: Performed by: NURSE ANESTHETIST, CERTIFIED REGISTERED

## 2017-10-27 PROCEDURE — 51701 INSERT BLADDER CATHETER: CPT

## 2017-10-27 PROCEDURE — 63600175 PHARM REV CODE 636 W HCPCS: Performed by: ANESTHESIOLOGY

## 2017-10-27 PROCEDURE — 59514 CESAREAN DELIVERY ONLY: CPT | Mod: AS,,, | Performed by: PHYSICIAN ASSISTANT

## 2017-10-27 PROCEDURE — 0DNW0ZZ RELEASE PERITONEUM, OPEN APPROACH: ICD-10-PCS | Performed by: OBSTETRICS & GYNECOLOGY

## 2017-10-27 PROCEDURE — 11000001 HC ACUTE MED/SURG PRIVATE ROOM

## 2017-10-27 PROCEDURE — 25000003 PHARM REV CODE 250: Performed by: OBSTETRICS & GYNECOLOGY

## 2017-10-27 PROCEDURE — 59510 CESAREAN DELIVERY: CPT | Mod: ,,, | Performed by: OBSTETRICS & GYNECOLOGY

## 2017-10-27 RX ORDER — SIMETHICONE 80 MG
1 TABLET,CHEWABLE ORAL EVERY 6 HOURS PRN
Status: DISCONTINUED | OUTPATIENT
Start: 2017-10-27 | End: 2017-10-29 | Stop reason: HOSPADM

## 2017-10-27 RX ORDER — PHENYLEPHRINE HYDROCHLORIDE 10 MG/ML
INJECTION INTRAVENOUS
Status: DISCONTINUED | OUTPATIENT
Start: 2017-10-27 | End: 2017-10-27

## 2017-10-27 RX ORDER — HYDROMORPHONE HCL IN 0.9% NACL 6 MG/30 ML
PATIENT CONTROLLED ANALGESIA SYRINGE INTRAVENOUS CONTINUOUS
Status: DISCONTINUED | OUTPATIENT
Start: 2017-10-27 | End: 2017-10-27

## 2017-10-27 RX ORDER — OXYCODONE AND ACETAMINOPHEN 10; 325 MG/1; MG/1
1 TABLET ORAL EVERY 4 HOURS PRN
Status: DISCONTINUED | OUTPATIENT
Start: 2017-10-27 | End: 2017-10-29 | Stop reason: HOSPADM

## 2017-10-27 RX ORDER — ONDANSETRON 8 MG/1
8 TABLET, ORALLY DISINTEGRATING ORAL EVERY 8 HOURS PRN
Status: DISCONTINUED | OUTPATIENT
Start: 2017-10-27 | End: 2017-10-29 | Stop reason: HOSPADM

## 2017-10-27 RX ORDER — OXYCODONE AND ACETAMINOPHEN 5; 325 MG/1; MG/1
1 TABLET ORAL EVERY 4 HOURS PRN
Status: DISCONTINUED | OUTPATIENT
Start: 2017-10-27 | End: 2017-10-29 | Stop reason: HOSPADM

## 2017-10-27 RX ORDER — IBUPROFEN 800 MG/1
800 TABLET ORAL EVERY 6 HOURS
Status: DISCONTINUED | OUTPATIENT
Start: 2017-10-27 | End: 2017-10-29 | Stop reason: HOSPADM

## 2017-10-27 RX ORDER — OXYTOCIN/RINGER'S LACTATE 20/1000 ML
41.65 PLASTIC BAG, INJECTION (ML) INTRAVENOUS CONTINUOUS
Status: DISPENSED | OUTPATIENT
Start: 2017-10-27 | End: 2017-10-27

## 2017-10-27 RX ORDER — ACETAMINOPHEN 325 MG/1
650 TABLET ORAL EVERY 6 HOURS PRN
Status: DISCONTINUED | OUTPATIENT
Start: 2017-10-27 | End: 2017-10-29 | Stop reason: HOSPADM

## 2017-10-27 RX ORDER — HYDROCORTISONE 25 MG/G
CREAM TOPICAL 3 TIMES DAILY PRN
Status: DISCONTINUED | OUTPATIENT
Start: 2017-10-27 | End: 2017-10-29 | Stop reason: HOSPADM

## 2017-10-27 RX ORDER — CHLORHEXIDINE GLUCONATE ORAL RINSE 1.2 MG/ML
10 SOLUTION DENTAL 2 TIMES DAILY
Status: DISCONTINUED | OUTPATIENT
Start: 2017-10-27 | End: 2017-10-29 | Stop reason: HOSPADM

## 2017-10-27 RX ORDER — ADHESIVE BANDAGE
30 BANDAGE TOPICAL DAILY PRN
Status: DISCONTINUED | OUTPATIENT
Start: 2017-10-27 | End: 2017-10-29 | Stop reason: HOSPADM

## 2017-10-27 RX ORDER — HYDROMORPHONE HYDROCHLORIDE 2 MG/ML
1 INJECTION, SOLUTION INTRAMUSCULAR; INTRAVENOUS; SUBCUTANEOUS EVERY 4 HOURS PRN
Status: DISCONTINUED | OUTPATIENT
Start: 2017-10-27 | End: 2017-10-29 | Stop reason: HOSPADM

## 2017-10-27 RX ORDER — BISACODYL 10 MG
10 SUPPOSITORY, RECTAL RECTAL ONCE AS NEEDED
Status: ACTIVE | OUTPATIENT
Start: 2017-10-27 | End: 2017-10-27

## 2017-10-27 RX ORDER — OXYTOCIN 10 [USP'U]/ML
INJECTION, SOLUTION INTRAMUSCULAR; INTRAVENOUS
Status: DISCONTINUED | OUTPATIENT
Start: 2017-10-27 | End: 2017-10-27

## 2017-10-27 RX ORDER — DIPHENHYDRAMINE HYDROCHLORIDE 50 MG/ML
25 INJECTION INTRAMUSCULAR; INTRAVENOUS EVERY 4 HOURS PRN
Status: DISCONTINUED | OUTPATIENT
Start: 2017-10-27 | End: 2017-10-29 | Stop reason: HOSPADM

## 2017-10-27 RX ORDER — DOCUSATE SODIUM 100 MG/1
100 CAPSULE, LIQUID FILLED ORAL DAILY
Status: DISCONTINUED | OUTPATIENT
Start: 2017-10-28 | End: 2017-10-29 | Stop reason: HOSPADM

## 2017-10-27 RX ORDER — NALOXONE HCL 0.4 MG/ML
0.02 VIAL (ML) INJECTION
Status: DISCONTINUED | OUTPATIENT
Start: 2017-10-27 | End: 2017-10-27

## 2017-10-27 RX ORDER — DIPHENHYDRAMINE HCL 25 MG
25 CAPSULE ORAL EVERY 4 HOURS PRN
Status: DISCONTINUED | OUTPATIENT
Start: 2017-10-27 | End: 2017-10-29 | Stop reason: HOSPADM

## 2017-10-27 RX ORDER — KETOROLAC TROMETHAMINE 30 MG/ML
INJECTION, SOLUTION INTRAMUSCULAR; INTRAVENOUS
Status: DISCONTINUED | OUTPATIENT
Start: 2017-10-27 | End: 2017-10-27

## 2017-10-27 RX ORDER — FENTANYL CITRATE 50 UG/ML
INJECTION, SOLUTION INTRAMUSCULAR; INTRAVENOUS
Status: DISCONTINUED | OUTPATIENT
Start: 2017-10-27 | End: 2017-10-27

## 2017-10-27 RX ORDER — LIDOCAINE HCL/EPINEPHRINE/PF 2%-1:200K
VIAL (ML) INJECTION
Status: DISCONTINUED | OUTPATIENT
Start: 2017-10-27 | End: 2017-10-27

## 2017-10-27 RX ADMIN — SODIUM CHLORIDE, SODIUM LACTATE, POTASSIUM CHLORIDE, AND CALCIUM CHLORIDE: 600; 310; 30; 20 INJECTION, SOLUTION INTRAVENOUS at 12:10

## 2017-10-27 RX ADMIN — MEPERIDINE HYDROCHLORIDE 75 MG: 50 INJECTION, SOLUTION INTRAMUSCULAR; INTRAVENOUS; SUBCUTANEOUS at 01:10

## 2017-10-27 RX ADMIN — OXYTOCIN 20 UNITS: 10 INJECTION, SOLUTION INTRAMUSCULAR; INTRAVENOUS at 12:10

## 2017-10-27 RX ADMIN — Medication: at 03:10

## 2017-10-27 RX ADMIN — FENTANYL CITRATE 100 MCG: 50 INJECTION, SOLUTION INTRAMUSCULAR; INTRAVENOUS at 08:10

## 2017-10-27 RX ADMIN — SODIUM CHLORIDE, SODIUM LACTATE, POTASSIUM CHLORIDE, AND CALCIUM CHLORIDE: 600; 310; 30; 20 INJECTION, SOLUTION INTRAVENOUS at 06:10

## 2017-10-27 RX ADMIN — FENTANYL CITRATE 50 MCG: 50 INJECTION, SOLUTION INTRAMUSCULAR; INTRAVENOUS at 01:10

## 2017-10-27 RX ADMIN — OXYCODONE HYDROCHLORIDE AND ACETAMINOPHEN 1 TABLET: 10; 325 TABLET ORAL at 11:10

## 2017-10-27 RX ADMIN — IBUPROFEN 800 MG: 800 TABLET, FILM COATED ORAL at 07:10

## 2017-10-27 RX ADMIN — CEFAZOLIN 2 G: 1 INJECTION, POWDER, FOR SOLUTION INTRAMUSCULAR; INTRAVENOUS at 12:10

## 2017-10-27 RX ADMIN — LIDOCAINE HYDROCHLORIDE,EPINEPHRINE BITARTRATE 6 ML: 20; .005 INJECTION, SOLUTION EPIDURAL; INFILTRATION; INTRACAUDAL; PERINEURAL at 08:10

## 2017-10-27 RX ADMIN — OXYTOCIN 20 UNITS: 10 INJECTION, SOLUTION INTRAMUSCULAR; INTRAVENOUS at 01:10

## 2017-10-27 RX ADMIN — PROMETHAZINE HYDROCHLORIDE 12.5 MG: 25 INJECTION INTRAMUSCULAR; INTRAVENOUS at 01:10

## 2017-10-27 RX ADMIN — FENTANYL CITRATE 100 MCG: 50 INJECTION, SOLUTION INTRAMUSCULAR; INTRAVENOUS at 10:10

## 2017-10-27 RX ADMIN — LIDOCAINE HYDROCHLORIDE,EPINEPHRINE BITARTRATE 6 ML: 20; .005 INJECTION, SOLUTION EPIDURAL; INFILTRATION; INTRACAUDAL; PERINEURAL at 10:10

## 2017-10-27 RX ADMIN — PHENYLEPHRINE HYDROCHLORIDE 100 MCG: 10 INJECTION INTRAVENOUS at 12:10

## 2017-10-27 RX ADMIN — LIDOCAINE HYDROCHLORIDE,EPINEPHRINE BITARTRATE 10 ML: 20; .005 INJECTION, SOLUTION EPIDURAL; INFILTRATION; INTRACAUDAL; PERINEURAL at 12:10

## 2017-10-27 RX ADMIN — FENTANYL CITRATE 100 MCG: 50 INJECTION, SOLUTION INTRAMUSCULAR; INTRAVENOUS at 12:10

## 2017-10-27 RX ADMIN — ROPIVACAINE HYDROCHLORIDE: 2 INJECTION, SOLUTION EPIDURAL; INFILTRATION at 02:10

## 2017-10-27 RX ADMIN — CHLORHEXIDINE GLUCONATE 10 ML: 1.2 RINSE ORAL at 10:10

## 2017-10-27 RX ADMIN — KETOROLAC TROMETHAMINE 30 MG: 30 INJECTION, SOLUTION INTRAMUSCULAR; INTRAVENOUS at 01:10

## 2017-10-27 NOTE — PROGRESS NOTES
[facility]  Obstetrics  Labor Progress Note    Patient Name: Modesta Smith  MRN: [id number]  Admission Date: 10/26/2017  Hospital Length of Stay: 1 days  Attending Physician: Savanah Bee, *  Primary Care Provider: Primary Doctor No    Subjective:     Principal Problem:<principal problem not specified>    Hospital Course:  SROM clear at noon  Pitocin begun  Epidural for comfort    Interval History:  Modesta is a 27 y.o.  at 37w6d. She is doing well. Comfortable  with epidural    Objective:     Vital Signs (Most Recent):  Temp: 98.8 °F (37.1 °C) (10/27/17 0433)  Pulse: 103 (10/27/17 0448)  Resp: 20 (10/27/17 0433)  BP: 121/70 (10/27/17 0448)  SpO2: 100 % (10/26/17 2155) Vital Signs (24h Range):  Temp:  [97.8 °F (36.6 °C)-99.9 °F (37.7 °C)] 98.8 °F (37.1 °C)  Pulse:  [] 103  Resp:  [18-20] 20  SpO2:  [20 %-100 %] 100 %  BP: ()/(39-94) 121/70     Weight: 113.4 kg (250 lb)  Body mass index is 44.29 kg/m².    FHT: 150Cat 1 (reassuring)  TOCO:  Q 2-3 minutes    Cervical Exam:  Dilation:  5  Effacement:  75%  Station: -2  Presentation: Vertex     Significant Labs:  Lab Results   Component Value Date    GROUPTRH AB NEG 10/26/2017    HEPBSAG Negative 2017    STREPBCULT No Group B Streptococcus isolated 10/09/2017       I have personallly reviewed all pertinent lab results from the last 24 hours.    Physical Exam:   Constitutional: She is oriented to person, place, and time. She appears well-developed and well-nourished.     Eyes: Conjunctivae are normal. Pupils are equal, round, and reactive to light.    Neck: Normal range of motion.    Cardiovascular: Normal rate and regular rhythm.     Pulmonary/Chest: Breath sounds normal.        Abdominal: Soft.     Genitourinary: Vagina normal and uterus normal.           Musculoskeletal: Normal range of motion and moves all extremeties.       Neurological: She is alert and oriented to person, place, and time.    Skin: Skin is warm.     Psychiatric: She has a normal mood and affect. Her behavior is normal. Thought content normal.       Assessment/Plan:     27 y.o. female  at 37w6d for:    PROM (premature rupture of membranes)    IUP at 37w5d  SROM at 12 noon  Prior c section x2  Desires MARQUEZ  P admit for labor management  Pitocin begun at 4mu  Epidural placed for comfort  On peanut ball              Emma Payne CNM  Obstetrics  [facility]

## 2017-10-27 NOTE — PROGRESS NOTES
I spoke with Dr. Bee and pt, exp lack of progress, + caput, prolonged ROM, LGA by my assessment, will proceed with C/S.

## 2017-10-27 NOTE — SUBJECTIVE & OBJECTIVE
Interval History:  Modesta is a 27 y.o.  at 37w6d. She is doing well. Comfortable  with epidural    Objective:     Vital Signs (Most Recent):  Temp: 98.8 °F (37.1 °C) (10/27/17 0433)  Pulse: 103 (10/27/17 0448)  Resp: 20 (10/27/17 0433)  BP: 121/70 (10/27/17 0448)  SpO2: 100 % (10/26/17 2155) Vital Signs (24h Range):  Temp:  [97.8 °F (36.6 °C)-99.9 °F (37.7 °C)] 98.8 °F (37.1 °C)  Pulse:  [] 103  Resp:  [18-20] 20  SpO2:  [20 %-100 %] 100 %  BP: ()/(39-94) 121/70     Weight: 113.4 kg (250 lb)  Body mass index is 44.29 kg/m².    FHT: 150Cat 1 (reassuring)  TOCO:  Q 2-3 minutes    Cervical Exam:  Dilation:  5  Effacement:  75%  Station: -2  Presentation: Vertex     Significant Labs:  Lab Results   Component Value Date    GROUPTRH AB NEG 10/26/2017    HEPBSAG Negative 2017    STREPBCULT No Group B Streptococcus isolated 10/09/2017       I have personallly reviewed all pertinent lab results from the last 24 hours.    Physical Exam:   Constitutional: She is oriented to person, place, and time. She appears well-developed and well-nourished.     Eyes: Conjunctivae are normal. Pupils are equal, round, and reactive to light.    Neck: Normal range of motion.    Cardiovascular: Normal rate and regular rhythm.     Pulmonary/Chest: Breath sounds normal.        Abdominal: Soft.     Genitourinary: Vagina normal and uterus normal.           Musculoskeletal: Normal range of motion and moves all extremeties.       Neurological: She is alert and oriented to person, place, and time.    Skin: Skin is warm.    Psychiatric: She has a normal mood and affect. Her behavior is normal. Thought content normal.

## 2017-10-27 NOTE — LACTATION NOTE
Mother states that she only wishes to pump, and formula feed her baby while waiting her milk to come in. Reviewed with mother milk production mechanism. Medela Symphony pump at bedside. Instructed on proper usage and to adjust suction according to comfort level. Verified appropriate flange fit- 24. Educated mother on frequency and duration of pumping in order to promote and maintain full milk supply. Hands on pumping technique reviewed. Encouraged hand expression after. Instructed mother on cleaning of breast pump parts. Reviewed proper milk handling, collection, storage, and transportation. Voices understanding.  Lactation packet given and admit information reviewed. Mother verbalizes understanding of expected  behaviors and output for the first 48 hours of life.  Discussed the importance of cue based feedings on demand, unrestricted access to the breast, and frequent uninterrupted skin to skin contact.  Risk and implications of artificial nipples and supplementation discussed.  Encouraged mother to call for assistance when desired or when infant is showing signs of hunger, contact number provided, mother verbalizes understanding.

## 2017-10-27 NOTE — ANESTHESIA PREPROCEDURE EVALUATION
10/26/2017  Modesta Smith is a 27 y.o., female.    Anesthesia Evaluation    I have reviewed the Patient Summary Reports.    I have reviewed the Nursing Notes.      Review of Systems  Anesthesia Hx:  No problems with previous Anesthesia   Denies Personal Hx of Anesthesia complications.   Social:  Non-Smoker    Hematology/Oncology:  Hematology Normal   Oncology Normal     EENT/Dental:EENT/Dental Normal   Cardiovascular:  Cardiovascular Normal     Pulmonary:  Pulmonary Normal    Renal/:  Renal/ Normal     Hepatic/GI:  Hepatic/GI Normal    Musculoskeletal:  Musculoskeletal Normal    Neurological:  Neurology Normal    Endocrine:   Diabetes, gestational    Dermatological:  Skin Normal    Psych:  Psychiatric Normal           Physical Exam  General:  Obesity    Airway/Jaw/Neck:  Airway Findings: Mouth Opening: Normal General Airway Assessment: Adult, Good  Mallampati: II  Improves to II with phonation.  TM Distance: Normal, at least 6 cm        Eyes/Ears/Nose:  EYES/EARS/NOSE FINDINGS: Normal   Dental:  Dental Findings: In tact   Chest/Lungs:  Chest/Lungs Findings: Normal Respiratory Rate     Heart/Vascular:  Heart Findings: Rhythm: Regular Rhythm  Heart murmur: negative Vascular Findings: Normal    Abdomen:  Abdomen Findings: Normal    Musculoskeletal:  Musculoskeletal Findings: Normal   Skin:  Skin Findings: Normal    Mental Status:  Mental Status Findings:  Alert and Oriented         Anesthesia Plan  Type of Anesthesia, risks & benefits discussed:  Anesthesia Type:  epidural  Patient's Preference:   Intra-op Monitoring Plan:   Intra-op Monitoring Plan Comments:   Post Op Pain Control Plan:   Post Op Pain Control Plan Comments:   Induction:    Beta Blocker:  Patient is not currently on a Beta-Blocker (No further documentation required).       Informed Consent: Patient understands risks and agrees with  Anesthesia plan.  Questions answered.   ASA Score: 2     Day of Surgery Review of History & Physical: I have interviewed and examined the patient. I have reviewed the patient's H&P dated:  There are no significant changes. Significant changes noted: Surgeon notified.          Ready For Surgery From Anesthesia Perspective.

## 2017-10-27 NOTE — ANESTHESIA PROCEDURE NOTES
Epidural    Patient location during procedure: OB   Reason for block: primary anesthetic   Diagnosis: iup   Start time: 10/26/2017 9:15 PM  Timeout: 10/26/2017 9:11 PM  End time: 10/26/2017 9:30 PM  Staffing  Anesthesiologist: ALLY MONTAGUE  Resident/CRNA: LUCY MCCORMACK  Performed: anesthesiologist   Preanesthetic Checklist  Completed: patient identified, pre-op evaluation, timeout performed, IV checked, risks and benefits discussed, monitors and equipment checked, anesthesia consent given, hand hygiene performed and patient being monitored  Preparation  Patient position: sitting  Prep: Betadine  Patient monitoring: Pulse Ox  Epidural  Skin Anesthetic: lidocaine 1%  Skin Wheal: 3 mL  Administration type: continuous  Approach: midline  Interspace: L3-4  Injection technique: DARLIN saline  Needle and Epidural Catheter  Needle type: Tuohy   Needle gauge: 18  Needle length: 3.5 inches  Catheter type: multi-orifice  Catheter size: 20 G  Catheter at skin depth: 11 cm  Additional Documentation: incremental injection, negative aspiration for heme and CSF, no signs/symptoms of IV or SA injection, no paresthesia on injection, no significant pain on injection and no significant complaints from patient  Needle localization: anatomical landmarks  Medications:  Bolus administered: 12 mL of 0.2% ropivacaine  Volume per aspiration: 5 mL  Time between aspirations: 1 minutes  Assessment  Patient's tolerance of the procedure: comfortable throughout block and no complaints  Post dural Puncture Headache?: No

## 2017-10-27 NOTE — ASSESSMENT & PLAN NOTE
IUP at 37w5d  SROM at 12 noon  Prior c section x2  Desires MARQUEZ  P admit for labor management  Pitocin begun at 4mu  Epidural placed for comfort  On peanut ball

## 2017-10-27 NOTE — PROGRESS NOTES
[facility]  Obstetrics  Labor Progress Note    Patient Name: Modesta Smith  MRN: [id number]  Admission Date: 10/26/2017  Hospital Length of Stay: 1 days  Attending Physician: Savanah Bee, *  Primary Care Provider: Primary Doctor No    Subjective:     Principal Problem:PROM (premature rupture of membranes)    Hospital Course:  SROM clear at noon  Pitocin begun  Epidural for comfort    Interval History:  Modesta is a 27 y.o.  at 37w6d. She is complaining of vaginal pressure    Objective:     Vital Signs (Most Recent):  Temp: 98.2 °F (36.8 °C) (10/27/17 0800)  Pulse: 75 (10/27/17 0833)  Resp: 18 (10/27/17 0633)  BP: 101/81 (10/27/17 0833)  SpO2: 100 % (10/26/17 2155) Vital Signs (24h Range):  Temp:  [97.8 °F (36.6 °C)-100.1 °F (37.8 °C)] 98.2 °F (36.8 °C)  Pulse:  [] 75  Resp:  [18-20] 18  SpO2:  [20 %-100 %] 100 %  BP: ()/(39-94) 101/81     Weight: 113.4 kg (250 lb)  Body mass index is 44.29 kg/m².    FHT: Cat 1 (reassuring)  TOCO:  Q 3-4 minutes    Cervical Exam: per RN  Dilation:  6  Effacement:  85  Station: 0  Presentation: Vertex     Significant Labs:  Lab Results   Component Value Date    GROUPTRH AB NEG 10/26/2017    HEPBSAG Negative 2017    STREPBCULT No Group B Streptococcus isolated 10/09/2017       I have personallly reviewed all pertinent lab results from the last 24 hours.    Physical Exam    Assessment/Plan:     27 y.o. female  at 37w6d for:    * PROM (premature rupture of membranes)    IUP at 37w5d  SROM at 12 noon  Prior c section x2  Desires MARQUEZ  P: pitocin infusing      Peanut ball      epidural              History of     Previous c/s X 2- first c/s for arrest of labor at 8 cm, #2 repeat c/s              Susanne Hernandez CNM  Obstetrics  [facility]

## 2017-10-27 NOTE — ASSESSMENT & PLAN NOTE
IUP at 37w5d  SROM at 12 noon  Prior c section x2  Desires MARQUEZ  P: pitocin infusing      Peanut ball      epidural

## 2017-10-27 NOTE — SUBJECTIVE & OBJECTIVE
Obstetric HPI:  Patient reports None contractions, active fetal movement, No vaginal bleeding , Yes loss of fluid       Obstetric History       T2      L2     SAB0   TAB0   Ectopic0   Multiple0   Live Births2       # Outcome Date GA Lbr Chong/2nd Weight Sex Delivery Anes PTL Lv   3 Current            2 Term 13 40w0d  3.997 kg (8 lb 13 oz) F CS-Unspec Spinal N JASPER   1 Term 07 40w0d 13:00 3.997 kg (8 lb 13 oz) M CS-LTranv EPI  JASPER      Name: edy      Complications: Failure to Progress in First Stage      Obstetric Comments   Elective INDX, did not dilate past 8cm     Past Medical History:   Diagnosis Date    sickle cell trait      Past Surgical History:   Procedure Laterality Date     SECTION      X2    MOUTH SURGERY         PTA Medications   Medication Sig    PNV NO.122/IRON/FOLIC ACID (PRENATAL MULTI ORAL) Take 1 tablet by mouth once daily at 6am.    blood sugar diagnostic Strp 1 strip by Misc.(Non-Drug; Combo Route) route 6 (six) times daily. To check BG 4 times daily to use with insurance preferred meter.    blood-glucose meter kit To check BG 4 times daily to use with insurance preferred meter.    lancets 33 gauge Misc 1 lancet by Misc.(Non-Drug; Combo Route) route 4 (four) times daily. To check BG 4 times daily to use with insurance preferred meter.       Review of patient's allergies indicates:  No Known Allergies     Family History     Problem Relation (Age of Onset)    Diabetes Other    Hypertension Other, Mother, Father        Social History Main Topics    Smoking status: Never Smoker    Smokeless tobacco: Never Used    Alcohol use No    Drug use: No    Sexual activity: Yes     Partners: Male     Birth control/ protection: OCP     Review of Systems   Objective:     Vital Signs (Most Recent):  Temp: 98.6 °F (37 °C) (10/27/17 1145)  Pulse: 104 (10/27/17 1147)  Resp: 18 (10/27/17 0633)  BP: 104/64 (10/27/17 1147)  SpO2: 100 % (10/26/17 4585) Vital Signs (24h  Range):  Temp:  [97.8 °F (36.6 °C)-100.1 °F (37.8 °C)] 98.6 °F (37 °C)  Pulse:  [] 104  Resp:  [18-20] 18  SpO2:  [20 %-100 %] 100 %  BP: ()/(39-94) 104/64     Weight: 113.4 kg (250 lb)  Body mass index is 44.29 kg/m².    FHT:Cat 2 no changes in 3 hours   TOCO:  Q 3-4 minutes    Physical Exam   Physical Exam:   Constitutional: She is oriented to person, place, and time. She appears well-developed and well-nourished.     Eyes: Conjunctivae are normal. Pupils are equal, round, and reactive to light.    Neck: Normal range of motion.    Cardiovascular: Normal rate and regular rhythm.     Pulmonary/Chest: Breath sounds normal.        Abdominal: Soft.     Genitourinary: Vagina normal and uterus normal.           Musculoskeletal: Normal range of motion and moves all extremeties.       Neurological: She is alert and oriented to person, place, and time.    Skin: Skin is warm.    Psychiatric: She has a normal mood and affect. Her behavior is normal. Thought content normal.     Cervix:  PER CNM  Dilation:  6       Significant Labs:  Lab Results   Component Value Date    GROUPTRH AB NEG 10/26/2017    HEPBSAG Negative 04/05/2017    STREPBCULT No Group B Streptococcus isolated 10/09/2017       I have personallly reviewed all pertinent lab results from the last 24 hours.

## 2017-10-27 NOTE — H&P
[facility]   Obstetrics  History & Physical    Patient Name: Modesta Smith  MRN: [id number]  Admission Date: 10/26/2017  Primary Care Provider: Primary Doctor No    Subjective:     Principal Problem:PROM (premature rupture of membranes)    History of Present Illness:   IUP at 37w5d  SROM at 1200  Prior c/s x2    Obstetric HPI:  Patient reports None contractions, active fetal movement, No vaginal bleeding , Yes loss of fluid       Obstetric History       T2      L2     SAB0   TAB0   Ectopic0   Multiple0   Live Births2       # Outcome Date GA Lbr Chong/2nd Weight Sex Delivery Anes PTL Lv   3 Current            2 Term 13 40w0d  3.997 kg (8 lb 13 oz) F CS-Unspec Spinal N JASPER   1 Term 07 40w0d 13:00 3.997 kg (8 lb 13 oz) M CS-LTranv EPI  JASPER      Name: edy      Complications: Failure to Progress in First Stage      Obstetric Comments   Elective INDX, did not dilate past 8cm     Past Medical History:   Diagnosis Date    sickle cell trait      Past Surgical History:   Procedure Laterality Date     SECTION      X2    MOUTH SURGERY         PTA Medications   Medication Sig    PNV NO.122/IRON/FOLIC ACID (PRENATAL MULTI ORAL) Take 1 tablet by mouth once daily at 6am.    blood sugar diagnostic Strp 1 strip by Misc.(Non-Drug; Combo Route) route 6 (six) times daily. To check BG 4 times daily to use with insurance preferred meter.    blood-glucose meter kit To check BG 4 times daily to use with insurance preferred meter.    lancets 33 gauge Misc 1 lancet by Misc.(Non-Drug; Combo Route) route 4 (four) times daily. To check BG 4 times daily to use with insurance preferred meter.       Review of patient's allergies indicates:  No Known Allergies     Family History     Problem Relation (Age of Onset)    Diabetes Other    Hypertension Other, Mother, Father        Social History Main Topics    Smoking status: Never Smoker    Smokeless tobacco: Never Used    Alcohol use  No    Drug use: No    Sexual activity: Yes     Partners: Male     Birth control/ protection: OCP     Review of Systems   Objective:     Vital Signs (Most Recent):  Temp: 98.6 °F (37 °C) (10/27/17 1145)  Pulse: 104 (10/27/17 1147)  Resp: 18 (10/27/17 0633)  BP: 104/64 (10/27/17 1147)  SpO2: 100 % (10/26/17 2155) Vital Signs (24h Range):  Temp:  [97.8 °F (36.6 °C)-100.1 °F (37.8 °C)] 98.6 °F (37 °C)  Pulse:  [] 104  Resp:  [18-20] 18  SpO2:  [20 %-100 %] 100 %  BP: ()/(39-94) 104/64     Weight: 113.4 kg (250 lb)  Body mass index is 44.29 kg/m².    FHT:Cat 2 no changes in 3 hours   TOCO:  Q 3-4 minutes    Physical Exam   Physical Exam:   Constitutional: She is oriented to person, place, and time. She appears well-developed and well-nourished.     Eyes: Conjunctivae are normal. Pupils are equal, round, and reactive to light.    Neck: Normal range of motion.    Cardiovascular: Normal rate and regular rhythm.     Pulmonary/Chest: Breath sounds normal.        Abdominal: Soft.     Genitourinary: Vagina normal and uterus normal.           Musculoskeletal: Normal range of motion and moves all extremeties.       Neurological: She is alert and oriented to person, place, and time.    Skin: Skin is warm.    Psychiatric: She has a normal mood and affect. Her behavior is normal. Thought content normal.     Cervix:  PER CNM  Dilation:  6       Significant Labs:  Lab Results   Component Value Date    GROUPTRH AB NEG 10/26/2017    HEPBSAG Negative 2017    STREPBCULT No Group B Streptococcus isolated 10/09/2017       I have personallly reviewed all pertinent lab results from the last 24 hours.    Assessment/Plan:     27 y.o. female  at 37w6d for:    * PROM (premature rupture of membranes)    IUP at 37w5d  SROM at 12 noon  Prior c section x2  Desires MARQUEZ  P: pitocin infusing      Peanut ball      epidural              History of     Previous c/s X 2- first c/s for arrest of labor at 8 cm, #2 repeat  c/s    10/27/2017  12:09 PM  Patient is now 24 hrs after SROM and due to failure to progress for the last several hours, will plan for repeat LTCS with Dr. Bee, who agrees with plan.  All risks, benefits, alternatives were discussed with the patient and consent was obtained.               Sindi Aceves PA-C  Obstetrics  [facility]

## 2017-10-27 NOTE — SUBJECTIVE & OBJECTIVE
Interval History:  Modesta is a 27 y.o.  at 37w6d. She is complaining of vaginal pressure    Objective:     Vital Signs (Most Recent):  Temp: 98.2 °F (36.8 °C) (10/27/17 0800)  Pulse: 75 (10/27/17 0833)  Resp: 18 (10/27/17 0633)  BP: 101/81 (10/27/17 0833)  SpO2: 100 % (10/26/17 2155) Vital Signs (24h Range):  Temp:  [97.8 °F (36.6 °C)-100.1 °F (37.8 °C)] 98.2 °F (36.8 °C)  Pulse:  [] 75  Resp:  [18-20] 18  SpO2:  [20 %-100 %] 100 %  BP: ()/(39-94) 101/81     Weight: 113.4 kg (250 lb)  Body mass index is 44.29 kg/m².    FHT: Cat 1 (reassuring)  TOCO:  Q 3-4 minutes    Cervical Exam: per RN  Dilation:  6  Effacement:  85  Station: 0  Presentation: Vertex     Significant Labs:  Lab Results   Component Value Date    GROUPTRH AB NEG 10/26/2017    HEPBSAG Negative 2017    STREPBCULT No Group B Streptococcus isolated 10/09/2017       I have personallly reviewed all pertinent lab results from the last 24 hours.    Physical Exam

## 2017-10-27 NOTE — PROGRESS NOTES
[facility]  Obstetrics  Labor Progress Note    Patient Name: Modesta Smith  MRN: [id number]  Admission Date: 10/26/2017  Hospital Length of Stay: 0 days  Attending Physician: Savanah Bee, *  Primary Care Provider: Primary Doctor No    Subjective:     Principal Problem:<principal problem not specified>    Hospital Course:  SROM clear at noon  Pitocin begun  Epidural for comfort    Obstetric HPI:  Patient reports Frequency: Every 3-8 minutes contractions, active fetal movement, No vaginal bleeding , Yes loss of fluid     This pregnancy has been complicated by prior c section x 2,RH negative,sickle cell trait,obesity, diet controlled GD Requesting epidural for comfort    Obstetric History       T2      L2     SAB0   TAB0   Ectopic0   Multiple0   Live Births2       # Outcome Date GA Lbr Chong/2nd Weight Sex Delivery Anes PTL Lv   3 Current            2 Term 13 40w0d  3.997 kg (8 lb 13 oz) F CS-Unspec Spinal N JASPER   1 Term 07 40w0d 13:00 3.997 kg (8 lb 13 oz) M CS-LTranv EPI  JASPER      Name: edy      Complications: Failure to Progress in First Stage      Obstetric Comments   Elective INDX, did not dilate past 8cm     Past Medical History:   Diagnosis Date    sickle cell trait      Past Surgical History:   Procedure Laterality Date     SECTION      X2    MOUTH SURGERY         PTA Medications   Medication Sig    PNV NO.122/IRON/FOLIC ACID (PRENATAL MULTI ORAL) Take 1 tablet by mouth once daily at 6am.    blood sugar diagnostic Strp 1 strip by Misc.(Non-Drug; Combo Route) route 6 (six) times daily. To check BG 4 times daily to use with insurance preferred meter.    blood-glucose meter kit To check BG 4 times daily to use with insurance preferred meter.    lancets 33 gauge Misc 1 lancet by Misc.(Non-Drug; Combo Route) route 4 (four) times daily. To check BG 4 times daily to use with insurance preferred meter.       Review of patient's allergies  indicates:  No Known Allergies     Family History     Problem Relation (Age of Onset)    Diabetes Other    Hypertension Other, Mother, Father        Social History Main Topics    Smoking status: Never Smoker    Smokeless tobacco: Never Used    Alcohol use No    Drug use: No    Sexual activity: Yes     Partners: Male     Birth control/ protection: OCP     Review of Systems   Constitutional: Negative.    HENT: Negative.    Eyes: Negative.    Respiratory: Negative.    Cardiovascular: Negative.    Gastrointestinal: Negative.    Endocrine: Negative.    Genitourinary: Negative.    Musculoskeletal: Negative.    Skin:  Negative.   Neurological: Negative.    Hematological: Negative.    Psychiatric/Behavioral: Negative.    Breast: negative.    All other systems reviewed and are negative.     Objective:     Vital Signs (Most Recent):    Vital Signs (24h Range):           There is no height or weight on file to calculate BMI.    FHT: 144Cat 1 (reassuring)  TOCO:  Q 2-3 minutes    Physical Exam    Cervix:  Dilation:  2      Defer now until epidural placed  Effacement:  75%  Station: -2  Presentation: Vertex     Significant Labs:  Lab Results   Component Value Date    GROUPTRH AB NEG 2017    HEPBSAG Negative 2017    STREPBCULT No Group B Streptococcus isolated 10/09/2017       I have personallly reviewed all pertinent lab results from the last 24 hours.    Assessment/Plan:     27 y.o. female  at 37w5d for:    PROM (premature rupture of membranes)    IUP at 37w5d  SROM at 12 noon  Prior c section x2  Desires MARQUEZ  P admit for labor management  Pitocin begun  Epidural placed for comfort              Emma Payne CNM  Obstetrics  [facility]

## 2017-10-27 NOTE — OP NOTE
OPERATIVE NOTE    DATE OF PROCEDURE:  10/27/2017    PREOPERATIVE DIAGNOSIS:    1.  Pregnancy at 37 weeks 6 days EGA  2.  Previous  x 1  3.  PROM  4.  Failure to progress at 5 cm dilation    POSTOPERATIVE DIAGNOSIS   1.  Pregnancy at 37 weeks 6 days EGA  2.  Previous  x 1  3.  PROM  4.  Failure to progress at 5 cm dilation  5.  Viable female infant.  6.  Dense pelvic adhesions      OPERATIVE PROCEDURE:  Repeat  Low Transverse  Section, Lysis of Adhesions    SURGEON:  Savanah Bee MD    ASSISTANT:  ELI Thompson    ANESTHESIA:  Epidural    ESTIMATED BLOOD LOSS:  700    FINDINGS:  Dense adhesions from uterus to anterior abdominal wall and rectus muscles.  Normal uterus, tubes, and ovaries.  Very thin lower uterine segment.  Viable infant.    PROSTETICS/DEVICES: None    COMPLICATIONS:  None    SPECIMEN:  None     PROCEDURE IN DETAIL:   The procedure was explained to the patient and informed consent was obtained. The patient was brought to the operating room where epidural anesthesia was administered. A schwab catheter was placed, and she was prepped and draped in the usual sterile manner. A time out was performed. A pfannensteil skin incision was made with the scalpel and the incision was extended sharply to the rectus fascia. The fascial incision was extended bilaterally with peacock scissors and grasped with [facility] clamps. The fascia was dissected off of the underlying rectus muscles both superiorly and inferiorly. The rectus muscles were divided in the midline with sharp and blunt dissection. The peritoneum was grasped between two hemostats and entered sharply. The peritoneal incision was extended bluntly.  Dense adhesions were noted from the abdominal wall to the uterus.  These were taken down both sharply, bluntly, and with cautery.   A bladder retractor was placed. The vesicouterine peritoneum was incised with metzenbaum scissors and extended laterally to create the bladder flap.  The bladder flap was taken down with sharp dissection. The lower uterine segment was incised in a transverse fashion using the scapel. This incision was extended laterally with bandage scissors. Membranes were ruptured with clear fluid noted. The infant was delivered from the vertex position without difficulty. The nose and mouth were suctioned and the umbilical cord was doubly clamped and cut. The infant was handed off to the nursery staff in attendance. The placenta was delivered spontaneously and the uterus was cleared of all clots and debris. The uterine incision was closed with a number one Chromic suture in a running locking fashion. A single layer closure was performed. Good hemostasis was noted. The pelvis was irrigated and was well suctioned of all remaining blood clots. The rectus muscles were closed with 2-0 Vicryl suture with interrupted figure of eight sutures. The fascia was closed with a 0 looped PDS suture in a running fashion. The skin incision was well irrigated and closed with 4-0 Vicryl in a subcuticular fashion. Steri strips and a sterile dressing were placed over the incision. The patient tolerated the procedure well and was brought to the recovery room in stable condition. All counts were correct x 3.

## 2017-10-27 NOTE — PLAN OF CARE
Problem: Patient Care Overview  Goal: Plan of Care Review  Outcome: Ongoing (interventions implemented as appropriate)  Temp 98.8 orally, SROM Clear, epidural for pain management. Pitocin for augmentation.

## 2017-10-27 NOTE — PROGRESS NOTES
Pt c/o pressure, SVE 6/80/v/-1, ant lip edematous, no change in 2 IUPC inserted, pitocin infusing, Cat 2 tracing, early decels, minimal variability, UC Q 2-3.5 min, adequate intensity  A: Cat 2 tracing       No change in 2+ hours       22.5 hours ROM  P: consulting MD about labor status and cervical exam

## 2017-10-27 NOTE — ASSESSMENT & PLAN NOTE
Previous c/s X 2- first c/s for arrest of labor at 8 cm, #2 repeat c/s    10/27/2017  12:09 PM  Patient is now 24 hrs after SROM and due to failure to progress for the last several hours, will plan for repeat LTCS with Dr. Bee, who agrees with plan.  All risks, benefits, alternatives were discussed with the patient and consent was obtained.

## 2017-10-28 PROBLEM — O47.9 THREATENED LABOR AT TERM: Status: RESOLVED | Noted: 2017-10-21 | Resolved: 2017-10-28

## 2017-10-28 PROBLEM — O42.90 PROM (PREMATURE RUPTURE OF MEMBRANES): Status: RESOLVED | Noted: 2017-10-26 | Resolved: 2017-10-28

## 2017-10-28 LAB
ABO + RH BLD: NORMAL
BASOPHILS # BLD AUTO: 0.04 K/UL
BASOPHILS NFR BLD: 0.3 %
DIFFERENTIAL METHOD: ABNORMAL
EOSINOPHIL # BLD AUTO: 0.1 K/UL
EOSINOPHIL NFR BLD: 0.6 %
ERYTHROCYTE [DISTWIDTH] IN BLOOD BY AUTOMATED COUNT: 14.2 %
FETAL CELL SCN BLD QL ROSETTE: NORMAL
HCT VFR BLD AUTO: 36.1 %
HGB BLD-MCNC: 12.4 G/DL
LYMPHOCYTES # BLD AUTO: 1.7 K/UL
LYMPHOCYTES NFR BLD: 12.4 %
MCH RBC QN AUTO: 27.7 PG
MCHC RBC AUTO-ENTMCNC: 34.3 G/DL
MCV RBC AUTO: 81 FL
MONOCYTES # BLD AUTO: 0.7 K/UL
MONOCYTES NFR BLD: 4.7 %
NEUTROPHILS # BLD AUTO: 11.4 K/UL
NEUTROPHILS NFR BLD: 82 %
PLATELET # BLD AUTO: 159 K/UL
PMV BLD AUTO: 9.9 FL
RBC # BLD AUTO: 4.47 M/UL
WBC # BLD AUTO: 13.88 K/UL

## 2017-10-28 PROCEDURE — 86901 BLOOD TYPING SEROLOGIC RH(D): CPT

## 2017-10-28 PROCEDURE — 36415 COLL VENOUS BLD VENIPUNCTURE: CPT

## 2017-10-28 PROCEDURE — 99232 SBSQ HOSP IP/OBS MODERATE 35: CPT | Mod: ,,, | Performed by: OBSTETRICS & GYNECOLOGY

## 2017-10-28 PROCEDURE — 86900 BLOOD TYPING SEROLOGIC ABO: CPT

## 2017-10-28 PROCEDURE — 25000003 PHARM REV CODE 250: Performed by: OBSTETRICS & GYNECOLOGY

## 2017-10-28 PROCEDURE — 85025 COMPLETE CBC W/AUTO DIFF WBC: CPT

## 2017-10-28 PROCEDURE — 85461 HEMOGLOBIN FETAL: CPT

## 2017-10-28 PROCEDURE — 11000001 HC ACUTE MED/SURG PRIVATE ROOM

## 2017-10-28 RX ADMIN — OXYCODONE HYDROCHLORIDE AND ACETAMINOPHEN 1 TABLET: 10; 325 TABLET ORAL at 12:10

## 2017-10-28 RX ADMIN — OXYCODONE HYDROCHLORIDE AND ACETAMINOPHEN 1 TABLET: 10; 325 TABLET ORAL at 09:10

## 2017-10-28 RX ADMIN — OXYCODONE HYDROCHLORIDE AND ACETAMINOPHEN 1 TABLET: 10; 325 TABLET ORAL at 08:10

## 2017-10-28 RX ADMIN — IBUPROFEN 800 MG: 800 TABLET, FILM COATED ORAL at 07:10

## 2017-10-28 RX ADMIN — IBUPROFEN 800 MG: 800 TABLET, FILM COATED ORAL at 06:10

## 2017-10-28 RX ADMIN — OXYCODONE HYDROCHLORIDE AND ACETAMINOPHEN 1 TABLET: 10; 325 TABLET ORAL at 05:10

## 2017-10-28 RX ADMIN — IBUPROFEN 800 MG: 800 TABLET, FILM COATED ORAL at 12:10

## 2017-10-28 RX ADMIN — IBUPROFEN 800 MG: 800 TABLET, FILM COATED ORAL at 01:10

## 2017-10-28 RX ADMIN — OXYCODONE HYDROCHLORIDE AND ACETAMINOPHEN 1 TABLET: 10; 325 TABLET ORAL at 04:10

## 2017-10-28 RX ADMIN — CHLORHEXIDINE GLUCONATE 10 ML: 1.2 RINSE ORAL at 08:10

## 2017-10-28 RX ADMIN — DOCUSATE SODIUM 100 MG: 100 CAPSULE, LIQUID FILLED ORAL at 08:10

## 2017-10-28 NOTE — SUBJECTIVE & OBJECTIVE
Hospital course: SROM clear at noon  Pitocin begun  Epidural for comfort    Patient failure to progress after several hours at 6 cm and now 24 hours after PROM.  Patient underwent repeat  on 10/27.  Normal post op course.    Interval History:     Patient doing well today.  No complaints.  Pain controlled on oral meds.  Ambulating, voiding, and tolerating po.  Vaginal bleeding within normal limits.      Objective:     Vital Signs (Most Recent):  Temp: 97.8 °F (36.6 °C) (10/28/17 0800)  Pulse: 99 (10/28/17 0800)  Resp: 20 (10/28/17 0800)  BP: 124/72 (10/28/17 08)  SpO2: (!) 10 % (10/27/17 1817) Vital Signs (24h Range):  Temp:  [97.8 °F (36.6 °C)-98.7 °F (37.1 °C)] 97.8 °F (36.6 °C)  Pulse:  [] 99  Resp:  [20-32] 20  SpO2:  [10 %-100 %] 10 %  BP: (106-130)/() 124/72     Weight: 113.4 kg (250 lb)  Body mass index is 44.29 kg/m².      Intake/Output Summary (Last 24 hours) at 10/28/17 1152  Last data filed at 10/28/17 0430   Gross per 24 hour   Intake             2001 ml   Output             2050 ml   Net              -49 ml       Significant Labs:  Lab Results   Component Value Date    GROUPTRH AB NEG 10/28/2017    HEPBSAG Negative 2017    STREPBCULT No Group B Streptococcus isolated 10/09/2017       Recent Labs  Lab 10/28/17  1050   HGB 12.4   HCT 36.1*       I have personallly reviewed all pertinent lab results from the last 24 hours.    Physical Exam:   Constitutional: She is oriented to person, place, and time. She appears well-developed and well-nourished. No distress.      Neck: Neck supple.    Cardiovascular: Normal rate.     Pulmonary/Chest: Effort normal.        Abdominal: Soft. She exhibits no distension. There is no tenderness.             Musculoskeletal: She exhibits no edema or tenderness.       Neurological: She is alert and oriented to person, place, and time.     Psychiatric: She has a normal mood and affect.

## 2017-10-28 NOTE — LACTATION NOTE
"Lactation rounds.   Mother states that pumping is comfortable, states that she had a milk "knot" in the right breast last night, improved with breast massage.   No milk out yet; reviewed hands on pumping with mother.   Will call for further pumping assessment.     "

## 2017-10-28 NOTE — PROGRESS NOTES
Pt states she does not want the PCA pump anymore and wants to discontinue it early. Explained she will get pain medicine by mouth. Pt in agreement. PCA discontinued. 14.7 mls dilaudid wasted with KAMALA Blum RN.

## 2017-10-28 NOTE — PLAN OF CARE
Problem: Patient Care Overview  Goal: Plan of Care Review  Outcome: Ongoing (interventions implemented as appropriate)  Pt progressing well. Pain controlled with po meds. Patrick removed at 0130. Awaiting void. Vitals stable. Bonding with baby.

## 2017-10-28 NOTE — PROGRESS NOTES
[facility] -   Obstetrics  Postpartum Progress Note    Patient Name: Modesta Smith  MRN: [id number]  Admission Date: 10/26/2017  Hospital Length of Stay: 2 days  Attending Physician: Savanah Bee, *  Primary Care Provider: Primary Doctor No    Subjective:     Principal Problem:S/P repeat low transverse     Hospital course: SROM clear at noon  Pitocin begun  Epidural for comfort    Patient failure to progress after several hours at 6 cm and now 24 hours after PROM.  Patient underwent repeat  on 10/27.  Normal post op course.    Interval History:     Patient doing well today.  No complaints.  Pain controlled on oral meds.  Ambulating, voiding, and tolerating po.  Vaginal bleeding within normal limits.      Objective:     Vital Signs (Most Recent):  Temp: 97.8 °F (36.6 °C) (10/28/17 0800)  Pulse: 99 (10/28/17 0800)  Resp: 20 (10/28/17 0800)  BP: 124/72 (10/28/17 0800)  SpO2: (!) 10 % (10/27/17 1817) Vital Signs (24h Range):  Temp:  [97.8 °F (36.6 °C)-98.7 °F (37.1 °C)] 97.8 °F (36.6 °C)  Pulse:  [] 99  Resp:  [20-32] 20  SpO2:  [10 %-100 %] 10 %  BP: (106-130)/() 124/72     Weight: 113.4 kg (250 lb)  Body mass index is 44.29 kg/m².      Intake/Output Summary (Last 24 hours) at 10/28/17 1152  Last data filed at 10/28/17 0430   Gross per 24 hour   Intake             2001 ml   Output             2050 ml   Net              -49 ml       Significant Labs:  Lab Results   Component Value Date    GROUPTRH AB NEG 10/28/2017    HEPBSAG Negative 2017    STREPBCULT No Group B Streptococcus isolated 10/09/2017       Recent Labs  Lab 10/28/17  1050   HGB 12.4   HCT 36.1*       I have personallly reviewed all pertinent lab results from the last 24 hours.    Physical Exam:   Constitutional: She is oriented to person, place, and time. She appears well-developed and well-nourished. No distress.      Neck: Neck supple.    Cardiovascular: Normal rate.     Pulmonary/Chest:  Effort normal.        Abdominal: Soft. She exhibits no distension. There is no tenderness.             Musculoskeletal: She exhibits no edema or tenderness.       Neurological: She is alert and oriented to person, place, and time.     Psychiatric: She has a normal mood and affect.       Assessment/Plan:     27 y.o. female  for:    * S/P repeat low transverse     POD#1 s/p    Stable        History of     Failed trial of labor            Disposition: As patient meets milestones, will plan to discharge .    Savanah Bee MD  Obstetrics  [facility]

## 2017-10-28 NOTE — PLAN OF CARE
Problem: Patient Care Overview  Goal: Plan of Care Review  Outcome: Ongoing (interventions implemented as appropriate)  Patient progressing well.  Voids complete.  No IV.  Bleeding light. aquacel in place, no drainage.  Pumping until milk comes in to breast feed.  VSS. NAD

## 2017-10-28 NOTE — L&D DELIVERY NOTE
[facility]   Section   Operative Note    SUMMARY     Date of Procedure: 10/27/2017     Procedure: Procedure(s) (LRB):  DELIVERY- SECTION (N/A)    Surgeon(s) and Role:     * Savanah Bee MD - Primary      Delivery Information for  Gisela Smith    Birth information:  YOB: 2019   Time of birth: 12:46 PM   Sex: female   Head Delivery Date/Time: 10/27/2017 12:46 PM   Delivery type: , Low Transverse   Gestational Age: 37w6d    Delivery Providers    Delivering clinician:  Savanah Bee MD   Other personnel:   Provider Role   Dayron Zhu RN                Clio Measurements    Weight:  3360 g Length:  53.5 cm   Head circum.:  35.5 cm Chest circum.:  32 cm   Abdominal girth:  29.8 cm         Clio Assessment    Living status:  Living  Apgars:     1 Minute:   5 Minute:   10 Minute:   15 Minute:   20 Minute:     Skin Color:   0  1       Heart Rate:   2  2       Reflex Irritability:   2  2       Muscle Tone:   2  2       Respiratory Effort:   2  2       Total:   8  9                      Assisted Delivery Details:    Forceps attempted?:  No  Vacuum extractor attempted?:  No         Shoulder Dystocia    Shoulder dystocia present?:  No           Presentation and Position    Presentation:   Vertex   Position:   Middle    Occiput    Anterior            Interventions/Resuscitation    Method:  None, Bulb Suctioning, Tactile Stimulation       Cord    Vessels:  3 vessels  Complications:  None  Delayed Cord Clamping?:  Yes  Cord Clamped Date/Time:  10/27/2017 12:47 PM  Cord Blood Disposition:  Lab  Gases Sent?:  No  Stem Cell Collection (by MD):  No       Placenta    Date and time:  10/27/2017 12:51 PM  Removal:  Manual removal  Appearance:  Intact  Placenta disposition:  discarded           Labor Events:       labor: No     Labor Onset Date/Time:         Dilation Complete Date/Time:         Start Pushing Date/Time:       Rupture  Date/Time:              Rupture type:           Fluid Amount:        Fluid Color:        Fluid Odor:        Membrane Status (PeriCalm): SRM (Spontaneous Rupture)      Rupture Date/Time (PeriCalm): 10/26/2017 13:40:00      Fluid Amount (PeriCalm): Moderate      Fluid Color (PeriCalm): Clear       steroids: None     Antibiotics given for GBS:       Induction:       Indications for induction:  Prolonged ROM     Augmentation: oxytocin     Indications for augmentation: Ineffective Contraction Pattern     Labor complications: Fetal Intolerance     Additional complications:          Cervical ripening:                     Delivery:      Episiotomy:       Indication for Episiotomy:       Perineal Lacerations:   Repaired:      Periurethral Laceration:   Repaired:     Labial Laceration:   Repaired:     Sulcus Laceration:   Repaired:     Vaginal Laceration:   Repaired:     Cervical Laceration:   Repaired:     Repair suture:       Repair # of packets:       Vaginal delivery QBL (mL):        QBL (mL): 0     Combined Blood Loss (mL): 0     Vaginal Sweep Performed:       Surgicount Correct:         Other providers:       Anesthesia    Method:  Spinal          Details (if applicable):  Trial of Labor Yes    Categorization: Repeat    Priority: Routine   Indications for : Fetal Intolerance of Labor   Incision Type: low transverse     Additional  information:  Forceps:    Vacuum:    Breech:    Observed anomalies    Other (Comments):

## 2017-10-29 VITALS
RESPIRATION RATE: 20 BRPM | TEMPERATURE: 98 F | DIASTOLIC BLOOD PRESSURE: 80 MMHG | OXYGEN SATURATION: 10 % | SYSTOLIC BLOOD PRESSURE: 119 MMHG | WEIGHT: 250 LBS | BODY MASS INDEX: 44.3 KG/M2 | HEART RATE: 104 BPM | HEIGHT: 63 IN

## 2017-10-29 LAB — INJECT RH IG VOL PATIENT: NORMAL ML

## 2017-10-29 PROCEDURE — 99238 HOSP IP/OBS DSCHRG MGMT 30/<: CPT | Mod: ,,, | Performed by: OBSTETRICS & GYNECOLOGY

## 2017-10-29 PROCEDURE — 63600519 RHOGAM PHARM REV CODE 636 ALT 250 W HCPCS: Performed by: OBSTETRICS & GYNECOLOGY

## 2017-10-29 PROCEDURE — 25000003 PHARM REV CODE 250: Performed by: OBSTETRICS & GYNECOLOGY

## 2017-10-29 PROCEDURE — 63600175 PHARM REV CODE 636 W HCPCS: Performed by: OBSTETRICS & GYNECOLOGY

## 2017-10-29 PROCEDURE — 90670 PCV13 VACCINE IM: CPT | Performed by: OBSTETRICS & GYNECOLOGY

## 2017-10-29 PROCEDURE — 90471 IMMUNIZATION ADMIN: CPT | Performed by: OBSTETRICS & GYNECOLOGY

## 2017-10-29 RX ORDER — OXYCODONE AND ACETAMINOPHEN 5; 325 MG/1; MG/1
1 TABLET ORAL EVERY 4 HOURS PRN
Qty: 20 TABLET | Refills: 0 | Status: SHIPPED | OUTPATIENT
Start: 2017-10-29 | End: 2017-11-02 | Stop reason: SDUPTHER

## 2017-10-29 RX ORDER — IBUPROFEN 800 MG/1
800 TABLET ORAL EVERY 6 HOURS
Qty: 30 TABLET | Refills: 1 | Status: SHIPPED | OUTPATIENT
Start: 2017-10-29 | End: 2020-05-12

## 2017-10-29 RX ADMIN — SIMETHICONE CHEW TAB 80 MG 80 MG: 80 TABLET ORAL at 12:10

## 2017-10-29 RX ADMIN — OXYCODONE HYDROCHLORIDE AND ACETAMINOPHEN 1 TABLET: 10; 325 TABLET ORAL at 01:10

## 2017-10-29 RX ADMIN — PNEUMOCOCCAL 13-VALENT CONJUGATE VACCINE 0.5 ML: 2.2; 2.2; 2.2; 2.2; 2.2; 4.4; 2.2; 2.2; 2.2; 2.2; 2.2; 2.2; 2.2 INJECTION, SUSPENSION INTRAMUSCULAR at 10:10

## 2017-10-29 RX ADMIN — HUMAN RHO(D) IMMUNE GLOBULIN 300 MCG: 300 INJECTION, SOLUTION INTRAMUSCULAR at 08:10

## 2017-10-29 RX ADMIN — IBUPROFEN 800 MG: 800 TABLET, FILM COATED ORAL at 05:10

## 2017-10-29 RX ADMIN — DOCUSATE SODIUM 100 MG: 100 CAPSULE, LIQUID FILLED ORAL at 08:10

## 2017-10-29 RX ADMIN — OXYCODONE HYDROCHLORIDE AND ACETAMINOPHEN 1 TABLET: 10; 325 TABLET ORAL at 10:10

## 2017-10-29 RX ADMIN — IBUPROFEN 800 MG: 800 TABLET, FILM COATED ORAL at 12:10

## 2017-10-29 RX ADMIN — OXYCODONE HYDROCHLORIDE AND ACETAMINOPHEN 1 TABLET: 10; 325 TABLET ORAL at 05:10

## 2017-10-29 NOTE — PLAN OF CARE
Problem: Patient Care Overview  Goal: Plan of Care Review  Outcome: Ongoing (interventions implemented as appropriate)  Pt progressing well. Up ad satya and voiding without difficulty. Pain controlled with po meds. Vitals stable. Bonding with baby.

## 2017-10-29 NOTE — LACTATION NOTE
Mother had some questions about management of engorgement.   Primary Engorgement    If the milk is flowing, use wet or dry heat applied to the breasts for approximately 10min prior to each feeding as a comfort measure to facilitate the milk ejection reflex    Follow heat treatment with breast massage to soften hard/lumpy areas of the breast    Use unrestricted, frequent, effective feedings.      Wake baby to feed if necessary    Avoid pacifier and bottle feedings    Hand express or pump breasts to the point of comfort prn    Use cold treatments in the form of ice packs/gel packs/ frozen vegetables wrapped in a soft thin cloth and applied to the breasts for approximately 20min after each feeding until engorgement is resolved    Wear comfortable, supportive bra    Take pain medicine prn    Use anti-inflammatory medications if prescribed by physician    Lactation discharge information reviewed.  Mother is aware of warm line, and outpatient consultations and monthly support gatherings. Encouraged mother to contact lactation with any questions, concerns, or problems. Contact numbers provided, and mother verbalizes understanding.

## 2017-10-29 NOTE — DISCHARGE INSTRUCTIONS
"Mother Self Care:    Activity: Avoid strenuous exercise and get adequate rest.  No driving until the physician consent given.  Emotional Changes: Most women find birth to be a time of great emotional upheaval.  Sense of loss, mood swings, fatigue, anxiety, and feeling "let down" are common.  If feelings worsen or last more than a week, call your physician.  Breast Care/Breastfeeding: Wear a bra for comfort.  Keep nipples dry and apply your own breast milk or lanolin cream as needed for soreness.  Engorgement can be relieved with warm, moist heat before feedings.  You may also take Ibuprofen.  Breast Care/Bottle Feeding: Wear support bra 24 hours a day for one week.  Avoid stimulation to breasts.  You may use ice packs for discomfort.  Chucho-Care/Vaginal Bleeding: Remember to use your chucho-bottle after urinating.  Your flow will change from red, to pink, to yellow/white color over a period of 2 weeks.  Menstruation will return in 3-8 weeks, or longer if breastfeeding.  Episiotomy Vaginal Delivery: Stitches will dissolve within 10 days to 3 weeks.  Warm baths, tucks, and dermoplast will promote healing.  Avoid bubble baths or strong soaps.   Section/Tubal Ligation: Keep incision clean and dry.  Please remove steri-strips in 5-7 days.  You may shower, but avoid baths.  Sexual Activity/Pelvic Rest: No sexual activity, tampons, or douching until your physician gives you consent.  Diet: Continue to eat from the five basic food groups, including plenty of protein, fruits, vegetables, and whole grains.  Limit empty calories and high fat foods.  Drink enough fluids to satisfy thirst and add an extra 500 calories for breastfeeding.  Constipation/Hemorrhoids: Drink plenty of water.  You may take a stool softener or natural laxative (Metamucil). You may use tucks or hemorrhoid ointment and soak in a warm tub.    CALL YOUR OB DOCTOR IF ANY OF THE FOLLOWING OCCURS:  *Heavy bleeding - saturating a pad an hour or passing any " large (2-3 inches in size) blood clots.  *Any pain, redness, or tenderness in lower leg.  *You cannot care for yourself or your baby.  *Any signs of infection-      - Temperature greater than 100.5 degrees F      - Foul smelling vaginal discharge and/or incisional drainage      - Increased episiotomy or incisional pain      - Hot, hard, red or sore area on breast      - Flu-like symptoms      - Any urgency, frequency or burning with urination

## 2017-10-29 NOTE — PROGRESS NOTES
[facility] -   Obstetrics  Postpartum Progress Note    Patient Name: Modesta Smith  MRN: [id number]  Admission Date: 10/26/2017  Hospital Length of Stay: 3 days  Attending Physician: Savanah Bee, *  Primary Care Provider: Primary Doctor No    Subjective:     Principal Problem:S/P repeat low transverse     Hospital course: SROM clear at noon  Pitocin begun  Epidural for comfort    Patient failure to progress after several hours at 6 cm and now 24 hours after PROM.  Patient underwent repeat  on 10/27.  Normal post op course.    Interval History:   Patient doing well today.  No complaints.  Pain controlled on oral meds.  Ambulating, voiding, and tolerating po.  Vaginal bleeding within normal limits.     Objective:     Vital Signs (Most Recent):  Temp: 98.2 °F (36.8 °C) (10/29/17 0800)  Pulse: 99 (10/29/17 0800)  Resp: 20 (10/29/17 0800)  BP: 124/81 (10/29/17 0800)  SpO2: (!) 10 % (10/27/17 1817) Vital Signs (24h Range):  Temp:  [98 °F (36.7 °C)-98.7 °F (37.1 °C)] 98.2 °F (36.8 °C)  Pulse:  [] 99  Resp:  [19-20] 20  BP: (108-127)/(61-81) 124/81     Weight: 113.4 kg (250 lb)  Body mass index is 44.29 kg/m².    No intake or output data in the 24 hours ending 10/29/17 0957    Significant Labs:  Lab Results   Component Value Date    GROUPTRH AB NEG 10/28/2017    HEPBSAG Negative 2017    STREPBCULT No Group B Streptococcus isolated 10/09/2017       Recent Labs  Lab 10/28/17  1050   HGB 12.4   HCT 36.1*       I have personallly reviewed all pertinent lab results from the last 24 hours.    Physical Exam:   Constitutional: She is oriented to person, place, and time. She appears well-developed and well-nourished. No distress.      Neck: Neck supple.    Cardiovascular: Normal rate.     Pulmonary/Chest: Effort normal.        Abdominal: Soft. She exhibits no distension. There is no tenderness.             Musculoskeletal: She exhibits no edema or tenderness.        Neurological: She is alert and oriented to person, place, and time.     Psychiatric: She has a normal mood and affect.       Assessment/Plan:     27 y.o. female  for:    * S/P repeat low transverse     POD#2 s/p    Stable        History of     Failed trial of labor            Disposition: As patient meets milestones, will plan to discharge today.    Savanah Bee MD  Obstetrics  [facility]

## 2017-10-29 NOTE — SUBJECTIVE & OBJECTIVE
Hospital course: SROM clear at noon  Pitocin begun  Epidural for comfort    Patient failure to progress after several hours at 6 cm and now 24 hours after PROM.  Patient underwent repeat  on 10/27.  Normal post op course.    Interval History:   Patient doing well today.  No complaints.  Pain controlled on oral meds.  Ambulating, voiding, and tolerating po.  Vaginal bleeding within normal limits.     Objective:     Vital Signs (Most Recent):  Temp: 98.2 °F (36.8 °C) (10/29/17 0800)  Pulse: 99 (10/29/17 0800)  Resp: 20 (10/29/17 0800)  BP: 124/81 (10/29/17 0800)  SpO2: (!) 10 % (10/27/17 1817) Vital Signs (24h Range):  Temp:  [98 °F (36.7 °C)-98.7 °F (37.1 °C)] 98.2 °F (36.8 °C)  Pulse:  [] 99  Resp:  [19-20] 20  BP: (108-127)/(61-81) 124/81     Weight: 113.4 kg (250 lb)  Body mass index is 44.29 kg/m².    No intake or output data in the 24 hours ending 10/29/17 0957    Significant Labs:  Lab Results   Component Value Date    GROUPTRH AB NEG 10/28/2017    HEPBSAG Negative 2017    STREPBCULT No Group B Streptococcus isolated 10/09/2017       Recent Labs  Lab 10/28/17  1050   HGB 12.4   HCT 36.1*       I have personallly reviewed all pertinent lab results from the last 24 hours.    Physical Exam:   Constitutional: She is oriented to person, place, and time. She appears well-developed and well-nourished. No distress.      Neck: Neck supple.    Cardiovascular: Normal rate.     Pulmonary/Chest: Effort normal.        Abdominal: Soft. She exhibits no distension. There is no tenderness.             Musculoskeletal: She exhibits no edema or tenderness.       Neurological: She is alert and oriented to person, place, and time.     Psychiatric: She has a normal mood and affect.

## 2017-10-30 NOTE — DISCHARGE SUMMARY
[facility] -   Obstetrics  Discharge Summary      Patient Name: Modesta Smith  MRN: [id number]  Admission Date: 10/26/2017  Hospital Length of Stay: 3 days  Discharge Date and Time: 10/29/2017 12:15 PM  Attending Physician: No att. providers found   Discharging Provider: Savanah Bee MD  Primary Care Provider: Primary Doctor Natasha    HPI:  IUP at 37w5d  SROM at 1200  Prior c/s x2    Procedure(s) (LRB):  DELIVERY- SECTION (N/A)     Hospital Course:   SROM clear at noon  Pitocin begun  Epidural for comfort    Patient failure to progress after several hours at 6 cm and now 24 hours after PROM.  Patient underwent repeat  on 10/27.  Normal post op course.        Final Active Diagnoses:    Diagnosis Date Noted POA    PRINCIPAL PROBLEM:  S/P repeat low transverse  [Z98.891] 10/27/2017 Not Applicable    History of  [Z98.891] 2013 Not Applicable      Problems Resolved During this Admission:    Diagnosis Date Noted Date Resolved POA    PROM (premature rupture of membranes) [O42.90] 10/26/2017 10/28/2017 Yes        Labs: All labs within the past 24 hours have been reviewed    Immunizations     Date Immunization Status Dose Route/Site Given by    10/29/17 0832 Rho (D) Immune Globulin - IM Deferred (Other) 300 mcg Intramuscular/ Belle Adrianna, RN    10/29/17 0833 MMR Deferred 0.5 mL Subcutaneous/Left deltoid Belle Adrianna, RN    10/29/17 0832 Tdap Deferred 0.5 mL Intramuscular/Left deltoid Belle Adrianna, RN    10/29/17 0817 Rho (D) Immune Globulin - IM Given 300 mcg Intramuscular/Left Ventrogluteal Belle Adrianna, RN    10/29/17 1101 Pneumococcal Conjugate - 13 Valent Deleted 0.5 mL Intramuscular/Left deltoid     10/29/17 1059 Pneumococcal Conjugate - 13 Valent Given 0.5 mL Intramuscular/Left deltoid Belle AdriannaGENEVA          Delivery:    Episiotomy:     Lacerations:     Repair suture:     Repair # of packets:     Blood loss (ml): 0     Birth  information:  YOB: 2019   Time of birth: 12:46 PM   Sex: female   Delivery type: , Low Transverse   Gestational Age: 37w6d    Delivery Clinician:      Other providers:       Additional  information:  Forceps:    Vacuum:    Breech:    Observed anomalies      Living?:           APGARS  One minute Five minutes Ten minutes   Skin color:         Heart rate:         Grimace:         Muscle tone:         Breathing:         Totals: 8  9        Placenta: Delivered:       appearance    Pending Diagnostic Studies:     None          Discharged Condition: stable    Disposition: Home or Self Care    Follow Up:  Follow-up Information     OB-GYN NURSE, [facility] In 1 week.    Why:  Removal of wound dressing           Savanah Bee MD. Go in 4 weeks.    Specialties:  Obstetrics, Obstetrics and Gynecology  Why:  Postop check  Contact information:  [address] [place] [address] DR Homar [place]SE [address] [phone]                 Patient Instructions:     Diet general     Lifting restrictions   Scheduling Instructions: No lifting more than 15 pounds x 4 weeks.     Other restrictions (specify):   Scheduling Instructions: Pelvic rest x 6 weeks     Call MD for:  temperature >100.4     Call MD for:  persistent nausea and vomiting or diarrhea     Call MD for:  severe uncontrolled pain     Call MD for:  redness, tenderness, or signs of infection (pain, swelling, redness, odor or green/yellow discharge around incision site)     Call MD for:  difficulty breathing or increased cough     Call MD for:  severe persistent headache     Call MD for:  persistent dizziness, light-headedness, or visual disturbances     Leave dressing on - Keep it clean, dry, and intact until clinic visit       Medications:  Discharge Medication List as of 10/29/2017 11:13 AM      START taking these medications    Details   ibuprofen (ADVIL,MOTRIN) 800 MG tablet Take 1 tablet (800 mg total) by mouth every 6 (six) hours., Starting Sun 10/29/2017,  Normal      oxyCODONE-acetaminophen (PERCOCET) 5-325 mg per tablet Take 1 tablet by mouth every 4 (four) hours as needed., Starting Sun 10/29/2017, Normal         CONTINUE these medications which have NOT CHANGED    Details   PNV NO.122/IRON/FOLIC ACID (PRENATAL MULTI ORAL) Take 1 tablet by mouth once daily at 6am., Until Discontinued, Historical Med         STOP taking these medications       blood sugar diagnostic Strp Comments:   Reason for Stopping:         blood-glucose meter kit Comments:   Reason for Stopping:         lancets 33 gauge Misc Comments:   Reason for Stopping:               Savanah Bee MD  Obstetrics  [facility] -

## 2017-11-01 NOTE — ANESTHESIA POSTPROCEDURE EVALUATION
"Anesthesia Post Evaluation    Patient: Modesta Smith    Procedure(s) Performed: Procedure(s) (LRB):  DELIVERY- SECTION (N/A)    Final Anesthesia Type: general  Patient location during evaluation: PACU  Patient participation: Yes- Able to Participate  Level of consciousness: awake and alert  Post-procedure vital signs: reviewed and stable  Pain management: adequate  Airway patency: patent  PONV status at discharge: No PONV  Anesthetic complications: no      Cardiovascular status: blood pressure returned to baseline  Respiratory status: unassisted  Hydration status: euvolemic  Follow-up not needed.        Visit Vitals  /80   Pulse 104   Temp 36.8 °C (98.2 °F) (Oral)   Resp 20   Ht 5' 3" (1.6 m)   Wt 113.4 kg (250 lb)   LMP 2017 (Exact Date)   SpO2 (!) 10%   Breastfeeding? Unknown   BMI 44.29 kg/m²       Pain/Taina Score: No Data Recorded      "

## 2017-11-02 ENCOUNTER — CLINICAL SUPPORT (OUTPATIENT)
Dept: OBSTETRICS AND GYNECOLOGY | Facility: CLINIC | Age: 27
End: 2017-11-02
Payer: COMMERCIAL

## 2017-11-02 VITALS
BODY MASS INDEX: 43.74 KG/M2 | SYSTOLIC BLOOD PRESSURE: 124 MMHG | DIASTOLIC BLOOD PRESSURE: 80 MMHG | WEIGHT: 246.94 LBS

## 2017-11-02 PROCEDURE — 99999 PR PBB SHADOW E&M-EST. PATIENT-LVL I: CPT | Mod: PBBFAC,,,

## 2017-11-02 RX ORDER — OXYCODONE AND ACETAMINOPHEN 5; 325 MG/1; MG/1
1 TABLET ORAL EVERY 4 HOURS PRN
Qty: 20 TABLET | Refills: 0 | Status: SHIPPED | OUTPATIENT
Start: 2017-11-02 | End: 2020-05-12

## 2017-11-02 NOTE — PROGRESS NOTES
Pt c/o pain to R side of incision, dressing removed per LPN, no redness or drainage from incision, pt grimacing w/ pain, will send refill on pain meds to pharmacy

## 2017-11-02 NOTE — PROGRESS NOTES
Removed dressing. Pt. Tolerated well.Pt. Having some pain . Dequan will send in pain medicaion. Pt. Instructed to keep wound clean and dry. Pt. Voiced understanding.

## 2017-11-13 ENCOUNTER — HOSPITAL ENCOUNTER (EMERGENCY)
Facility: HOSPITAL | Age: 27
Discharge: LEFT BEFORE DISCHARGE | End: 2017-11-13
Attending: EMERGENCY MEDICINE
Payer: COMMERCIAL

## 2017-11-13 VITALS
SYSTOLIC BLOOD PRESSURE: 144 MMHG | DIASTOLIC BLOOD PRESSURE: 91 MMHG | TEMPERATURE: 99 F | BODY MASS INDEX: 39.09 KG/M2 | HEIGHT: 64 IN | RESPIRATION RATE: 20 BRPM | OXYGEN SATURATION: 98 % | WEIGHT: 229 LBS | HEART RATE: 84 BPM

## 2017-11-13 DIAGNOSIS — R06.02 SHORTNESS OF BREATH: ICD-10-CM

## 2017-11-13 PROCEDURE — 99900035 HC TECH TIME PER 15 MIN (STAT): Performed by: GENERAL PRACTICE

## 2017-11-13 PROCEDURE — 99284 EMERGENCY DEPT VISIT MOD MDM: CPT

## 2017-11-13 PROCEDURE — 93005 ELECTROCARDIOGRAM TRACING: CPT | Performed by: GENERAL PRACTICE

## 2017-11-13 PROCEDURE — 93010 ELECTROCARDIOGRAM REPORT: CPT | Mod: ,,, | Performed by: NUCLEAR MEDICINE

## 2017-11-13 RX ORDER — SULFAMETHOXAZOLE AND TRIMETHOPRIM 800; 160 MG/1; MG/1
1 TABLET ORAL
COMMUNITY
End: 2020-05-12

## 2017-11-13 NOTE — ED NOTES
Vinayak sent to apply cardiac monitoring and pulse ox. Pt told Vinayak she wanted to go to [facility]. RN went to speak to patient and she is confirming her decision to go to [facility]. Dr. Smith notified. AMA to be signed at this time.

## 2017-11-13 NOTE — DISCHARGE INSTRUCTIONS
________________    Please return if you change your mind, or go directly to another hospital of your choice.    __________________

## 2017-11-13 NOTE — ED NOTES
"Pt is currently questioning the need for IV and blood drawn. Pt states "why do I need this, I've had this same thing in the past and they just gave me a pill to take". Advised that due to recent hospitalization and other health factors these are the tests that Dr. Smith needs to complete care. Pt said she needed to think about it for a few minutes.   "

## 2017-11-13 NOTE — ED NOTES
Pt currently on Bactrim for  infection and infection in uterus. Pt is postpartal 2 weeks.   Pt reports being on 5 different antibiotics - 4 antibiotics were given IV at [facility]. Pt states information is not on discharge paperwork and doesn't know the names of the medications.

## 2017-11-14 ENCOUNTER — TELEPHONE (OUTPATIENT)
Dept: OBSTETRICS AND GYNECOLOGY | Facility: CLINIC | Age: 27
End: 2017-11-14

## 2017-11-14 NOTE — TELEPHONE ENCOUNTER
Patient is at Urgent Care in [place] with shortness of breath.  Patient has been to our ED yesterday and left AMA.  Patient has also been to [facility] for a wound infection, treated and released.  PA at urgent care says patients X-ray that she brought with her on CD shows fluid in right lower lung base and she feels patient is showing signs of CHF.  Patient in background stating she is going to leave there and go to [facility].  Patient encouraged by PA for me to go to [facility] emergency room to be evaluated for fluid overload.

## 2020-05-12 ENCOUNTER — HOSPITAL ENCOUNTER (EMERGENCY)
Facility: HOSPITAL | Age: 30
Discharge: HOME | End: 2020-05-12
Attending: EMERGENCY MEDICINE
Payer: COMMERCIAL

## 2020-05-12 VITALS
HEART RATE: 101 BPM | RESPIRATION RATE: 16 BRPM | TEMPERATURE: 97 F | BODY MASS INDEX: 40.04 KG/M2 | SYSTOLIC BLOOD PRESSURE: 128 MMHG | OXYGEN SATURATION: 98 % | HEIGHT: 63 IN | DIASTOLIC BLOOD PRESSURE: 92 MMHG | WEIGHT: 226 LBS

## 2020-05-12 DIAGNOSIS — S16.1XXA CERVICAL STRAIN, ACUTE, INITIAL ENCOUNTER: Primary | ICD-10-CM
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
## 2020-05-12 PROCEDURE — 99284 EMERGENCY DEPT VISIT MOD MDM: CPT | Mod: 25,ER

## 2020-05-12 PROCEDURE — 25000003 PHARM REV CODE 250: Mod: ER | Performed by: EMERGENCY MEDICINE

## 2020-05-12 RX ORDER — CYCLOBENZAPRINE HCL 10 MG
10 TABLET ORAL 3 TIMES DAILY PRN
Qty: 15 TABLET | Refills: 0 | Status: SHIPPED | OUTPATIENT
Start: 2020-05-12 | End: 2020-05-12 | Stop reason: SDUPTHER

## 2020-05-12 RX ORDER — DICLOFENAC SODIUM 75 MG/1
75 TABLET, DELAYED RELEASE ORAL 2 TIMES DAILY
Qty: 10 TABLET | Refills: 0 | Status: SHIPPED | OUTPATIENT
Start: 2020-05-12

## 2020-05-12 RX ORDER — IBUPROFEN 200 MG
400 TABLET ORAL
Status: COMPLETED | OUTPATIENT
Start: 2020-05-12 | End: 2020-05-12

## 2020-05-12 RX ORDER — CYCLOBENZAPRINE HCL 10 MG
10 TABLET ORAL 3 TIMES DAILY PRN
Qty: 15 TABLET | Refills: 0 | Status: SHIPPED | OUTPATIENT
Start: 2020-05-12 | End: 2020-05-17

## 2020-05-12 RX ORDER — HYDROCHLOROTHIAZIDE 12.5 MG/1
12.5 CAPSULE ORAL DAILY
COMMUNITY

## 2020-05-12 RX ADMIN — IBUPROFEN 400 MG: 200 TABLET, FILM COATED ORAL at 08:05
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
## 2021-10-13 ENCOUNTER — CLINICAL SUPPORT (OUTPATIENT)
Dept: OTHER | Facility: CLINIC | Age: 31
End: 2021-10-13

## 2021-10-13 DIAGNOSIS — Z00.8 ENCOUNTER FOR OTHER GENERAL EXAMINATION: ICD-10-CM

## 2021-10-14 VITALS — BODY MASS INDEX: 56.57 KG/M2 | HEIGHT: 55 IN

## 2021-10-14 LAB
GLUCOSE SERPL-MCNC: 97 MG/DL (ref 60–140)
HDLC SERPL-MCNC: 41 MG/DL
POC CHOLESTEROL, LDL (DOCK): 158 MG/DL
POC CHOLESTEROL, TOTAL: 220 MG/DL
TRIGL SERPL-MCNC: 101 MG/DL

## (undated) DEVICE — TAPE SILK 3IN

## (undated) DEVICE — DRESSING COVER AQUACEL AG SURG

## (undated) DEVICE — TAPE CLOTH SOFT MEDIPORE 4IN

## (undated) DEVICE — PAD ABD 8X10 STERILE

## (undated) DEVICE — DRESSING GAUZE 6PLY 4X4